# Patient Record
Sex: FEMALE | Race: WHITE | Employment: OTHER | URBAN - METROPOLITAN AREA
[De-identification: names, ages, dates, MRNs, and addresses within clinical notes are randomized per-mention and may not be internally consistent; named-entity substitution may affect disease eponyms.]

---

## 2020-01-22 ENCOUNTER — TRANSFERRED RECORDS (OUTPATIENT)
Dept: HEALTH INFORMATION MANAGEMENT | Facility: CLINIC | Age: 85
End: 2020-01-22

## 2020-11-09 ENCOUNTER — VIRTUAL VISIT (OUTPATIENT)
Dept: UROLOGY | Facility: CLINIC | Age: 85
End: 2020-11-09
Payer: COMMERCIAL

## 2020-11-09 DIAGNOSIS — R35.0 URINARY FREQUENCY: ICD-10-CM

## 2020-11-09 DIAGNOSIS — R39.15 URINARY URGENCY: ICD-10-CM

## 2020-11-09 PROCEDURE — 99204 OFFICE O/P NEW MOD 45 MIN: CPT | Mod: 95 | Performed by: UROLOGY

## 2020-11-09 RX ORDER — HYDROCHLOROTHIAZIDE 25 MG/1
25 TABLET ORAL DAILY
COMMUNITY

## 2020-11-09 NOTE — NURSING NOTE
Compounded estrogen cream ordered per Dr. Andre. Prescription sent to [facility] who will contact patient to discuss when ready.    Janet Garza RN, BSN

## 2020-11-09 NOTE — PROGRESS NOTES
CC:  Urinary urgency and frequency.    HPI:  Marnie Pittman is a 89 year old female new to our clinic. for the above.  This problem has been going on for a few months.  She was diagnosed with a uti and treated for 3 days and her symptoms improved slightly but did not resolve.  She then returned to her MD who gave her some additional antibiotics although the UA and UCx were negative, so she was asked to stop the antibiotics. It is associated with some urge incontinence.  It is minor and intermittent. The patient voids q2 hours, nocturia X q 2-3 hours.  She drinks 2 cups of coffee per day, a little juice and very little water, at the most 16 oz of water.  She denies any dysuria,  hematuria, hesitancy, intermittency, feeling of incomplete emptying, or any recent hx of UTI's or stones.  But she does have a slower stream    The patient has some constipation.  She is not sexually active and denies any dyspareunia or pelvic pain.   She denies any vaginal bulge.  She has no neurological or balance problems.     Obstetric Hx:  She is .    Menopause 52, HRT:  none    PMHx:  HTN    S/p partial hip replacement    Meds, Allergies, FHx and SHx reviewed per nurse's intake note.    ROS is negative on a 14 point scale except for except hard of hearing.  All other positive and pertinent information is mentioned in the HPI.    PEx:   There were no vitals taken for this visit.  Data Unavailable, There is no height or weight on file to calculate BMI., 0 lbs 0 oz  Gen appearance:  Well groomed  HEENT:  EOMI, AT NC, hearing aids  Psych:  Normal Affect  Neuro:  A/O X 3  Skin:  Well perfused  Resp:  No increased respiratory effort  Musk:  Full ROM in extremities  :  Deferred    ASSESSMENT and PLAN:  This is a 89 year old female with urinary urgency and frequency after in infection.  Different management options were discussed with the patient including observation, increasing fluid intake and estrogen cream.  The patient will  try.  -increase fluid intake to 40-60 oz per day  -Rx for Estrogen cream  -f/u in 3 months for cystoscopy     Thank you for allowing me to participate in Ms. Pittman's care.  I will keep you updated on her progress.    Cadence Andre MD

## 2020-11-09 NOTE — PROGRESS NOTES
"Marnie Pittman is a 89 year old female who is being evaluated via a billable video visit.      The patient has been notified of following:     \"This video visit will be conducted via a call between you and your physician/provider. We have found that certain health care needs can be provided without the need for an in-person physical exam.  This service lets us provide the care you need with a video conversation.  If a prescription is necessary we can send it directly to your pharmacy.  If lab work is needed we can place an order for that and you can then stop by our lab to have the test done at a later time.    Video visits are billed at different rates depending on your insurance coverage.  Please reach out to your insurance provider with any questions.    If during the course of the call the physician/provider feels a video visit is not appropriate, you will not be charged for this service.\"    Patient has given verbal consent for Video visit? Yes  How would you like to obtain your AVS? Mail a copy  If you are dropped from the video visit, the video invite should be resent to: [phone]  Will anyone else be joining your video visit? No        Video-Visit Details    Type of service:  Video Visit    Video Start Time: 11:15 AM  Video End Time: 11:45 AM    Originating Location (pt. Location): Home    Distant Location (provider location):  [facility]     Platform used for Video Visit: Yvette Andre MD      "

## 2020-11-09 NOTE — PATIENT INSTRUCTIONS
-increase fluid intake to 40-60 oz per day  -Rx for Estrogen cream  -f/u in 3 months for cystoscopy

## 2020-12-22 ENCOUNTER — TRANSFERRED RECORDS (OUTPATIENT)
Dept: HEALTH INFORMATION MANAGEMENT | Facility: CLINIC | Age: 85
End: 2020-12-22

## 2020-12-29 ENCOUNTER — TRANSFERRED RECORDS (OUTPATIENT)
Dept: HEALTH INFORMATION MANAGEMENT | Facility: CLINIC | Age: 85
End: 2020-12-29

## 2021-01-01 ENCOUNTER — OFFICE VISIT (OUTPATIENT)
Dept: TRANSPLANT | Facility: CLINIC | Age: 86
End: 2021-01-01
Payer: COMMERCIAL

## 2021-01-01 ENCOUNTER — TELEPHONE (OUTPATIENT)
Dept: SURGERY | Facility: CLINIC | Age: 86
End: 2021-01-01

## 2021-01-01 ENCOUNTER — OFFICE VISIT (OUTPATIENT)
Dept: RADIATION ONCOLOGY | Facility: CLINIC | Age: 86
End: 2021-01-01
Payer: COMMERCIAL

## 2021-01-01 ENCOUNTER — TRANSFERRED RECORDS (OUTPATIENT)
Dept: HEALTH INFORMATION MANAGEMENT | Facility: CLINIC | Age: 86
End: 2021-01-01

## 2021-01-01 ENCOUNTER — ANCILLARY PROCEDURE (OUTPATIENT)
Dept: CT IMAGING | Facility: CLINIC | Age: 86
End: 2021-01-01
Payer: COMMERCIAL

## 2021-01-01 ENCOUNTER — HEALTH MAINTENANCE LETTER (OUTPATIENT)
Age: 86
End: 2021-01-01

## 2021-01-01 ENCOUNTER — HOSPITAL ENCOUNTER (EMERGENCY)
Facility: CLINIC | Age: 86
End: 2021-01-01
Payer: COMMERCIAL

## 2021-01-01 ENCOUNTER — LAB (OUTPATIENT)
Dept: LAB | Facility: CLINIC | Age: 86
End: 2021-01-01
Payer: COMMERCIAL

## 2021-01-01 ENCOUNTER — ONCOLOGY VISIT (OUTPATIENT)
Dept: ONCOLOGY | Facility: CLINIC | Age: 86
End: 2021-01-01
Payer: COMMERCIAL

## 2021-01-01 ENCOUNTER — PATIENT OUTREACH (OUTPATIENT)
Dept: ONCOLOGY | Facility: CLINIC | Age: 86
End: 2021-01-01

## 2021-01-01 ENCOUNTER — NURSE TRIAGE (OUTPATIENT)
Dept: ONCOLOGY | Facility: CLINIC | Age: 86
End: 2021-01-01
Payer: COMMERCIAL

## 2021-01-01 ENCOUNTER — OFFICE VISIT (OUTPATIENT)
Dept: OTOLARYNGOLOGY | Facility: CLINIC | Age: 86
End: 2021-01-01
Payer: COMMERCIAL

## 2021-01-01 ENCOUNTER — PRE VISIT (OUTPATIENT)
Dept: TRANSPLANT | Facility: CLINIC | Age: 86
End: 2021-01-01

## 2021-01-01 ENCOUNTER — MYC MEDICAL ADVICE (OUTPATIENT)
Dept: ONCOLOGY | Facility: CLINIC | Age: 86
End: 2021-01-01
Payer: COMMERCIAL

## 2021-01-01 ENCOUNTER — PATIENT OUTREACH (OUTPATIENT)
Dept: ONCOLOGY | Facility: CLINIC | Age: 86
End: 2021-01-01
Payer: COMMERCIAL

## 2021-01-01 ENCOUNTER — PRE VISIT (OUTPATIENT)
Dept: OTOLARYNGOLOGY | Facility: CLINIC | Age: 86
End: 2021-01-01

## 2021-01-01 ENCOUNTER — TELEPHONE (OUTPATIENT)
Dept: ONCOLOGY | Facility: CLINIC | Age: 86
End: 2021-01-01
Payer: COMMERCIAL

## 2021-01-01 ENCOUNTER — APPOINTMENT (OUTPATIENT)
Dept: RADIATION ONCOLOGY | Facility: CLINIC | Age: 86
End: 2021-01-01
Attending: RADIOLOGY
Payer: COMMERCIAL

## 2021-01-01 ENCOUNTER — APPOINTMENT (OUTPATIENT)
Dept: LAB | Facility: CLINIC | Age: 86
End: 2021-01-01
Payer: COMMERCIAL

## 2021-01-01 VITALS
DIASTOLIC BLOOD PRESSURE: 69 MMHG | RESPIRATION RATE: 16 BRPM | HEART RATE: 78 BPM | SYSTOLIC BLOOD PRESSURE: 182 MMHG | WEIGHT: 160.2 LBS | OXYGEN SATURATION: 96 % | TEMPERATURE: 97.3 F

## 2021-01-01 VITALS
HEART RATE: 56 BPM | DIASTOLIC BLOOD PRESSURE: 65 MMHG | RESPIRATION RATE: 16 BRPM | WEIGHT: 147.7 LBS | SYSTOLIC BLOOD PRESSURE: 158 MMHG | TEMPERATURE: 98 F | OXYGEN SATURATION: 98 %

## 2021-01-01 VITALS
DIASTOLIC BLOOD PRESSURE: 67 MMHG | WEIGHT: 140.5 LBS | TEMPERATURE: 97.9 F | SYSTOLIC BLOOD PRESSURE: 146 MMHG | HEART RATE: 69 BPM | OXYGEN SATURATION: 98 %

## 2021-01-01 VITALS
TEMPERATURE: 97.2 F | DIASTOLIC BLOOD PRESSURE: 62 MMHG | WEIGHT: 147.9 LBS | RESPIRATION RATE: 20 BRPM | OXYGEN SATURATION: 96 % | HEART RATE: 64 BPM | SYSTOLIC BLOOD PRESSURE: 132 MMHG

## 2021-01-01 VITALS
HEART RATE: 67 BPM | WEIGHT: 146 LBS | OXYGEN SATURATION: 97 % | SYSTOLIC BLOOD PRESSURE: 118 MMHG | RESPIRATION RATE: 16 BRPM | TEMPERATURE: 97.1 F | DIASTOLIC BLOOD PRESSURE: 68 MMHG

## 2021-01-01 VITALS
WEIGHT: 140.3 LBS | HEART RATE: 80 BPM | DIASTOLIC BLOOD PRESSURE: 62 MMHG | SYSTOLIC BLOOD PRESSURE: 112 MMHG | OXYGEN SATURATION: 96 % | RESPIRATION RATE: 18 BRPM

## 2021-01-01 VITALS — OXYGEN SATURATION: 95 % | TEMPERATURE: 98.6 F | HEART RATE: 69 BPM

## 2021-01-01 DIAGNOSIS — C85.12 B-CELL LYMPHOMA OF INTRATHORACIC LYMPH NODES, UNSPECIFIED B-CELL LYMPHOMA TYPE (H): Primary | ICD-10-CM

## 2021-01-01 DIAGNOSIS — C85.12 B-CELL LYMPHOMA OF INTRATHORACIC LYMPH NODES, UNSPECIFIED B-CELL LYMPHOMA TYPE (H): ICD-10-CM

## 2021-01-01 DIAGNOSIS — Z23 ENCOUNTER FOR IMMUNIZATION: ICD-10-CM

## 2021-01-01 DIAGNOSIS — C83.32 DIFFUSE LARGE B-CELL LYMPHOMA OF INTRATHORACIC LYMPH NODES (H): Primary | ICD-10-CM

## 2021-01-01 DIAGNOSIS — R76.8 ELEVATED ANTI-TISSUE TRANSGLUTAMINASE (TTG) IGA LEVEL: ICD-10-CM

## 2021-01-01 DIAGNOSIS — Z11.59 ENCOUNTER FOR SCREENING FOR OTHER VIRAL DISEASES: ICD-10-CM

## 2021-01-01 DIAGNOSIS — R59.1 LYMPHADENOPATHY: ICD-10-CM

## 2021-01-01 DIAGNOSIS — R59.0 CERVICAL LYMPHADENOPATHY: Primary | ICD-10-CM

## 2021-01-01 LAB
ALBUMIN SERPL ELPH-MCNC: 3.6 G/DL (ref 3.7–5.1)
ALBUMIN SERPL-MCNC: 2.7 G/DL (ref 3.4–5)
ALBUMIN SERPL-MCNC: 3.3 G/DL (ref 3.4–5)
ALBUMIN SERPL-MCNC: 3.3 G/DL (ref 3.4–5)
ALBUMIN SERPL-MCNC: 3.6 G/DL (ref 3.4–5)
ALP SERPL-CCNC: 109 U/L (ref 40–150)
ALP SERPL-CCNC: 114 U/L (ref 40–150)
ALP SERPL-CCNC: 126 U/L (ref 40–150)
ALP SERPL-CCNC: 126 U/L (ref 40–150)
ALPHA1 GLOB SERPL ELPH-MCNC: 0.4 G/DL (ref 0.2–0.4)
ALPHA2 GLOB SERPL ELPH-MCNC: 1 G/DL (ref 0.5–0.9)
ALT SERPL W P-5'-P-CCNC: 15 U/L (ref 0–50)
ALT SERPL W P-5'-P-CCNC: 17 U/L (ref 0–50)
ALT SERPL W P-5'-P-CCNC: 18 U/L (ref 0–50)
ALT SERPL W P-5'-P-CCNC: 20 U/L (ref 0–50)
ANION GAP SERPL CALCULATED.3IONS-SCNC: 12 MMOL/L (ref 3–14)
ANION GAP SERPL CALCULATED.3IONS-SCNC: 4 MMOL/L (ref 3–14)
ANION GAP SERPL CALCULATED.3IONS-SCNC: 5 MMOL/L (ref 3–14)
ANION GAP SERPL CALCULATED.3IONS-SCNC: 8 MMOL/L (ref 3–14)
AST SERPL W P-5'-P-CCNC: 21 U/L (ref 0–45)
AST SERPL W P-5'-P-CCNC: 21 U/L (ref 0–45)
AST SERPL W P-5'-P-CCNC: 22 U/L (ref 0–45)
AST SERPL W P-5'-P-CCNC: 24 U/L (ref 0–45)
B-GLOBULIN SERPL ELPH-MCNC: 1.1 G/DL (ref 0.6–1)
BASOPHILS # BLD AUTO: 0 10E3/UL (ref 0–0.2)
BASOPHILS NFR BLD AUTO: 0 %
BASOPHILS NFR BLD AUTO: 1 %
BILIRUB SERPL-MCNC: 0.5 MG/DL (ref 0.2–1.3)
BILIRUB SERPL-MCNC: 0.9 MG/DL (ref 0.2–1.3)
BUN SERPL-MCNC: 22 MG/DL (ref 7–30)
BUN SERPL-MCNC: 23 MG/DL (ref 7–30)
BUN SERPL-MCNC: 23 MG/DL (ref 7–30)
BUN SERPL-MCNC: 28 MG/DL (ref 7–30)
CALCIUM SERPL-MCNC: 9.2 MG/DL (ref 8.5–10.1)
CALCIUM SERPL-MCNC: 9.3 MG/DL (ref 8.5–10.1)
CALCIUM SERPL-MCNC: 9.4 MG/DL (ref 8.5–10.1)
CALCIUM SERPL-MCNC: 9.6 MG/DL (ref 8.5–10.1)
CHLORIDE BLD-SCNC: 100 MMOL/L (ref 94–109)
CHLORIDE BLD-SCNC: 104 MMOL/L (ref 94–109)
CHLORIDE BLD-SCNC: 104 MMOL/L (ref 94–109)
CHLORIDE BLD-SCNC: 107 MMOL/L (ref 94–109)
CO2 SERPL-SCNC: 19 MMOL/L (ref 20–32)
CO2 SERPL-SCNC: 29 MMOL/L (ref 20–32)
CO2 SERPL-SCNC: 30 MMOL/L (ref 20–32)
CO2 SERPL-SCNC: 31 MMOL/L (ref 20–32)
COPATH REPORT: NORMAL
CREAT BLD-MCNC: 0.9 MG/DL (ref 0.52–1.04)
CREAT BLD-MCNC: 1.1 MG/DL
CREAT SERPL-MCNC: 0.86 MG/DL (ref 0.52–1.04)
CREAT SERPL-MCNC: 0.88 MG/DL (ref 0.52–1.04)
CREAT SERPL-MCNC: 0.92 MG/DL (ref 0.52–1.04)
CREAT SERPL-MCNC: 1.01 MG/DL (ref 0.52–1.04)
EOSINOPHIL # BLD AUTO: 0.1 10E3/UL (ref 0–0.7)
EOSINOPHIL # BLD AUTO: 0.2 10E3/UL (ref 0–0.7)
EOSINOPHIL # BLD AUTO: 0.2 10E3/UL (ref 0–0.7)
EOSINOPHIL # BLD AUTO: 0.4 10E3/UL (ref 0–0.7)
EOSINOPHIL NFR BLD AUTO: 2 %
EOSINOPHIL NFR BLD AUTO: 3 %
EOSINOPHIL NFR BLD AUTO: 4 %
EOSINOPHIL NFR BLD AUTO: 6 %
ERYTHROCYTE [DISTWIDTH] IN BLOOD BY AUTOMATED COUNT: 14.7 % (ref 10–15)
ERYTHROCYTE [DISTWIDTH] IN BLOOD BY AUTOMATED COUNT: 15.1 % (ref 10–15)
ERYTHROCYTE [DISTWIDTH] IN BLOOD BY AUTOMATED COUNT: 15.9 % (ref 10–15)
ERYTHROCYTE [DISTWIDTH] IN BLOOD BY AUTOMATED COUNT: 15.9 % (ref 10–15)
GAMMA GLOB SERPL ELPH-MCNC: 1.4 G/DL (ref 0.7–1.6)
GFR SERPL CREATININE-BSD FRML MDRD: 44 ML/MIN/1.73M2
GFR SERPL CREATININE-BSD FRML MDRD: 49 ML/MIN/1.73M2
GFR SERPL CREATININE-BSD FRML MDRD: 54 ML/MIN/1.73M2
GFR SERPL CREATININE-BSD FRML MDRD: 57 ML/MIN/1.73M2
GFR SERPL CREATININE-BSD FRML MDRD: 59 ML/MIN/1.73M2
GFR SERPL CREATININE-BSD FRML MDRD: 59 ML/MIN/{1.73_M2}
GLUCOSE BLD-MCNC: 88 MG/DL (ref 70–99)
GLUCOSE BLD-MCNC: 93 MG/DL (ref 70–99)
GLUCOSE BLD-MCNC: 96 MG/DL (ref 70–99)
GLUCOSE BLD-MCNC: 99 MG/DL (ref 70–99)
HCT VFR BLD AUTO: 35.5 % (ref 35–47)
HCT VFR BLD AUTO: 37.6 % (ref 35–47)
HCT VFR BLD AUTO: 39.7 % (ref 35–47)
HCT VFR BLD AUTO: 40.9 % (ref 35–47)
HGB BLD-MCNC: 11.6 G/DL (ref 11.7–15.7)
HGB BLD-MCNC: 12.3 G/DL (ref 11.7–15.7)
HGB BLD-MCNC: 12.9 G/DL (ref 11.7–15.7)
HGB BLD-MCNC: 13.3 G/DL (ref 11.7–15.7)
IGA SERPL-MCNC: 790 MG/DL (ref 84–499)
IGA SERPL-MCNC: 796 MG/DL (ref 84–499)
IGA SERPL-MCNC: 830 MG/DL (ref 84–499)
IGG SERPL-MCNC: 1249 MG/DL (ref 610–1616)
IGG SERPL-MCNC: 1276 MG/DL (ref 610–1616)
IGG SERPL-MCNC: 1290 MG/DL (ref 610–1616)
IGM SERPL-MCNC: 50 MG/DL (ref 35–242)
IGM SERPL-MCNC: 50 MG/DL (ref 35–242)
IGM SERPL-MCNC: 51 MG/DL (ref 35–242)
IMM GRANULOCYTES # BLD: 0 10E3/UL
IMM GRANULOCYTES NFR BLD: 0 %
KAPPA LC FREE SER-MCNC: 5.15 MG/DL (ref 0.33–1.94)
KAPPA LC FREE/LAMBDA FREE SER NEPH: 1.47 {RATIO} (ref 0.26–1.65)
LAMBDA LC FREE SERPL-MCNC: 3.5 MG/DL (ref 0.57–2.63)
LDH SERPL L TO P-CCNC: 181 U/L (ref 81–234)
LDH SERPL L TO P-CCNC: 187 U/L (ref 81–234)
LDH SERPL L TO P-CCNC: 200 U/L (ref 81–234)
LDH SERPL L TO P-CCNC: 323 U/L (ref 81–234)
LYMPHOCYTES # BLD AUTO: 1.4 10E3/UL (ref 0.8–5.3)
LYMPHOCYTES # BLD AUTO: 1.6 10E3/UL (ref 0.8–5.3)
LYMPHOCYTES # BLD AUTO: 1.8 10E3/UL (ref 0.8–5.3)
LYMPHOCYTES # BLD AUTO: 2.4 10E3/UL (ref 0.8–5.3)
LYMPHOCYTES NFR BLD AUTO: 21 %
LYMPHOCYTES NFR BLD AUTO: 27 %
LYMPHOCYTES NFR BLD AUTO: 30 %
LYMPHOCYTES NFR BLD AUTO: 35 %
M PROTEIN SERPL ELPH-MCNC: 0 G/DL
MCH RBC QN AUTO: 27.3 PG (ref 26.5–33)
MCH RBC QN AUTO: 27.3 PG (ref 26.5–33)
MCH RBC QN AUTO: 27.6 PG (ref 26.5–33)
MCH RBC QN AUTO: 27.6 PG (ref 26.5–33)
MCHC RBC AUTO-ENTMCNC: 32.5 G/DL (ref 31.5–36.5)
MCHC RBC AUTO-ENTMCNC: 32.5 G/DL (ref 31.5–36.5)
MCHC RBC AUTO-ENTMCNC: 32.7 G/DL (ref 31.5–36.5)
MCHC RBC AUTO-ENTMCNC: 32.7 G/DL (ref 31.5–36.5)
MCV RBC AUTO: 84 FL (ref 78–100)
MCV RBC AUTO: 84 FL (ref 78–100)
MCV RBC AUTO: 85 FL (ref 78–100)
MCV RBC AUTO: 85 FL (ref 78–100)
MONOCYTES # BLD AUTO: 0.6 10E3/UL (ref 0–1.3)
MONOCYTES # BLD AUTO: 0.6 10E3/UL (ref 0–1.3)
MONOCYTES # BLD AUTO: 0.8 10E3/UL (ref 0–1.3)
MONOCYTES # BLD AUTO: 0.8 10E3/UL (ref 0–1.3)
MONOCYTES NFR BLD AUTO: 10 %
MONOCYTES NFR BLD AUTO: 12 %
MONOCYTES NFR BLD AUTO: 14 %
MONOCYTES NFR BLD AUTO: 9 %
NEUTROPHILS # BLD AUTO: 3.1 10E3/UL (ref 1.6–8.3)
NEUTROPHILS # BLD AUTO: 3.3 10E3/UL (ref 1.6–8.3)
NEUTROPHILS # BLD AUTO: 3.4 10E3/UL (ref 1.6–8.3)
NEUTROPHILS # BLD AUTO: 4.4 10E3/UL (ref 1.6–8.3)
NEUTROPHILS NFR BLD AUTO: 49 %
NEUTROPHILS NFR BLD AUTO: 52 %
NEUTROPHILS NFR BLD AUTO: 57 %
NEUTROPHILS NFR BLD AUTO: 68 %
NRBC # BLD AUTO: 0 10E3/UL
NRBC BLD AUTO-RTO: 0 /100
PLATELET # BLD AUTO: 237 10E3/UL (ref 150–450)
PLATELET # BLD AUTO: 249 10E3/UL (ref 150–450)
PLATELET # BLD AUTO: 253 10E3/UL (ref 150–450)
PLATELET # BLD AUTO: 332 10E3/UL (ref 150–450)
POTASSIUM BLD-SCNC: 3.3 MMOL/L (ref 3.4–5.3)
POTASSIUM BLD-SCNC: 3.4 MMOL/L (ref 3.4–5.3)
POTASSIUM BLD-SCNC: 3.7 MMOL/L (ref 3.4–5.3)
POTASSIUM BLD-SCNC: 4 MMOL/L (ref 3.4–5.3)
PROT PATTERN SERPL ELPH-IMP: ABNORMAL
PROT PATTERN SERPL IFE-IMP: NORMAL
PROT SERPL-MCNC: 7.4 G/DL (ref 6.8–8.8)
PROT SERPL-MCNC: 8 G/DL (ref 6.8–8.8)
PROT SERPL-MCNC: 8.1 G/DL (ref 6.8–8.8)
PROT SERPL-MCNC: 8.2 G/DL (ref 6.8–8.8)
RBC # BLD AUTO: 4.25 10E6/UL (ref 3.8–5.2)
RBC # BLD AUTO: 4.45 10E6/UL (ref 3.8–5.2)
RBC # BLD AUTO: 4.67 10E6/UL (ref 3.8–5.2)
RBC # BLD AUTO: 4.88 10E6/UL (ref 3.8–5.2)
SODIUM SERPL-SCNC: 138 MMOL/L (ref 133–144)
SODIUM SERPL-SCNC: 139 MMOL/L (ref 133–144)
TOTAL PROTEIN SERUM FOR ELP: 7.6 G/DL (ref 6.8–8.8)
URATE SERPL-MCNC: 5.3 MG/DL (ref 2.6–6)
URATE SERPL-MCNC: 5.9 MG/DL (ref 2.6–6)
URATE SERPL-MCNC: 6.8 MG/DL (ref 2.6–6)
WBC # BLD AUTO: 5.9 10E3/UL (ref 4–11)
WBC # BLD AUTO: 6 10E3/UL (ref 4–11)
WBC # BLD AUTO: 6.6 10E3/UL (ref 4–11)
WBC # BLD AUTO: 6.7 10E3/UL (ref 4–11)

## 2021-01-01 PROCEDURE — 99205 OFFICE O/P NEW HI 60 MIN: CPT

## 2021-01-01 PROCEDURE — G0463 HOSPITAL OUTPT CLINIC VISIT: HCPCS

## 2021-01-01 PROCEDURE — 85025 COMPLETE CBC W/AUTO DIFF WBC: CPT

## 2021-01-01 PROCEDURE — 90471 IMMUNIZATION ADMIN: CPT

## 2021-01-01 PROCEDURE — 82040 ASSAY OF SERUM ALBUMIN: CPT

## 2021-01-01 PROCEDURE — 85004 AUTOMATED DIFF WBC COUNT: CPT

## 2021-01-01 PROCEDURE — 36415 COLL VENOUS BLD VENIPUNCTURE: CPT

## 2021-01-01 PROCEDURE — 83615 LACTATE (LD) (LDH) ENZYME: CPT

## 2021-01-01 PROCEDURE — G0463 HOSPITAL OUTPT CLINIC VISIT: HCPCS | Mod: 25

## 2021-01-01 PROCEDURE — G0463 HOSPITAL OUTPT CLINIC VISIT: HCPCS | Performed by: RADIOLOGY

## 2021-01-01 PROCEDURE — 90471 IMMUNIZATION ADMIN: CPT | Performed by: PHYSICIAN ASSISTANT

## 2021-01-01 PROCEDURE — 90750 HZV VACC RECOMBINANT IM: CPT

## 2021-01-01 PROCEDURE — 250N000021 HC RX MED A9270 GY (STAT IND- M) 250: Performed by: PHYSICIAN ASSISTANT

## 2021-01-01 PROCEDURE — 90750 HZV VACC RECOMBINANT IM: CPT | Performed by: PHYSICIAN ASSISTANT

## 2021-01-01 PROCEDURE — 999N001032 HC STATISTIC REVIEW OUTSIDE SLIDES TC 88321

## 2021-01-01 PROCEDURE — 83883 ASSAY NEPHELOMETRY NOT SPEC: CPT

## 2021-01-01 PROCEDURE — 84155 ASSAY OF PROTEIN SERUM: CPT | Mod: 59

## 2021-01-01 PROCEDURE — 99213 OFFICE O/P EST LOW 20 MIN: CPT

## 2021-01-01 PROCEDURE — 86334 IMMUNOFIX E-PHORESIS SERUM: CPT

## 2021-01-01 PROCEDURE — 84165 PROTEIN E-PHORESIS SERUM: CPT | Mod: 26 | Performed by: PATHOLOGY

## 2021-01-01 PROCEDURE — 82784 ASSAY IGA/IGD/IGG/IGM EACH: CPT

## 2021-01-01 PROCEDURE — 84550 ASSAY OF BLOOD/URIC ACID: CPT

## 2021-01-01 PROCEDURE — 99214 OFFICE O/P EST MOD 30 MIN: CPT

## 2021-01-01 PROCEDURE — 82565 ASSAY OF CREATININE: CPT | Performed by: RADIOLOGY

## 2021-01-01 PROCEDURE — 99215 OFFICE O/P EST HI 40 MIN: CPT | Mod: GC

## 2021-01-01 PROCEDURE — 99215 OFFICE O/P EST HI 40 MIN: CPT | Performed by: PHYSICIAN ASSISTANT

## 2021-01-01 PROCEDURE — 71260 CT THORAX DX C+: CPT | Performed by: RADIOLOGY

## 2021-01-01 PROCEDURE — 74177 CT ABD & PELVIS W/CONTRAST: CPT | Performed by: RADIOLOGY

## 2021-01-01 PROCEDURE — 80053 COMPREHEN METABOLIC PANEL: CPT

## 2021-01-01 PROCEDURE — 250N000021 HC RX MED A9270 GY (STAT IND- M) 250

## 2021-01-01 PROCEDURE — 99202 OFFICE O/P NEW SF 15 MIN: CPT | Performed by: SURGERY

## 2021-01-01 PROCEDURE — 88321 CONSLTJ&REPRT SLD PREP ELSWR: CPT | Performed by: PATHOLOGY

## 2021-01-01 RX ORDER — IOPAMIDOL 755 MG/ML
99 INJECTION, SOLUTION INTRAVASCULAR ONCE
Status: COMPLETED | OUTPATIENT
Start: 2021-01-01 | End: 2021-01-01

## 2021-01-01 RX ORDER — METHYLPREDNISOLONE SODIUM SUCCINATE 125 MG/2ML
125 INJECTION, POWDER, LYOPHILIZED, FOR SOLUTION INTRAMUSCULAR; INTRAVENOUS
Status: CANCELLED
Start: 2021-01-01

## 2021-01-01 RX ORDER — HEPARIN SODIUM (PORCINE) LOCK FLUSH IV SOLN 100 UNIT/ML 100 UNIT/ML
5 SOLUTION INTRAVENOUS
Status: CANCELLED | OUTPATIENT
Start: 2021-01-01

## 2021-01-01 RX ORDER — ONDANSETRON 2 MG/ML
8 INJECTION INTRAMUSCULAR; INTRAVENOUS ONCE
Status: CANCELLED | OUTPATIENT
Start: 2021-01-01

## 2021-01-01 RX ORDER — PREDNISONE 20 MG/1
60 TABLET ORAL DAILY
Qty: 60 TABLET | Refills: 1 | Status: SHIPPED | OUTPATIENT
Start: 2021-01-01

## 2021-01-01 RX ORDER — LORAZEPAM 2 MG/ML
0.5 INJECTION INTRAMUSCULAR EVERY 4 HOURS PRN
Status: CANCELLED | OUTPATIENT
Start: 2021-01-01

## 2021-01-01 RX ORDER — ALBUTEROL SULFATE 0.83 MG/ML
2.5 SOLUTION RESPIRATORY (INHALATION)
Status: CANCELLED | OUTPATIENT
Start: 2021-01-01

## 2021-01-01 RX ORDER — PREDNISONE 20 MG/1
40 TABLET ORAL DAILY
Qty: 14 TABLET | Refills: 0 | Status: SHIPPED | OUTPATIENT
Start: 2021-01-01 | End: 2021-01-01

## 2021-01-01 RX ORDER — TRAMADOL HYDROCHLORIDE 50 MG/1
50 TABLET ORAL EVERY 6 HOURS PRN
Qty: 60 TABLET | Refills: 1 | Status: SHIPPED | OUTPATIENT
Start: 2021-01-01

## 2021-01-01 RX ORDER — POTASSIUM CHLORIDE 750 MG/1
TABLET, EXTENDED RELEASE ORAL
COMMUNITY
Start: 2021-01-01 | End: 2021-01-01

## 2021-01-01 RX ORDER — ALBUTEROL SULFATE 90 UG/1
1-2 AEROSOL, METERED RESPIRATORY (INHALATION)
Status: CANCELLED
Start: 2021-01-01

## 2021-01-01 RX ORDER — EPINEPHRINE 1 MG/ML
0.3 INJECTION, SOLUTION INTRAMUSCULAR; SUBCUTANEOUS EVERY 5 MIN PRN
Status: CANCELLED | OUTPATIENT
Start: 2021-01-01

## 2021-01-01 RX ORDER — ACETAMINOPHEN 500 MG
500 TABLET ORAL
COMMUNITY
Start: 2021-01-01

## 2021-01-01 RX ORDER — PREDNISONE 20 MG/1
60 TABLET ORAL DAILY
Qty: 15 TABLET | Refills: 0 | Status: SHIPPED | OUTPATIENT
Start: 2021-01-01 | End: 2021-01-01

## 2021-01-01 RX ORDER — ALLOPURINOL 300 MG/1
300 TABLET ORAL DAILY
Qty: 30 TABLET | Refills: 1 | Status: SHIPPED | OUTPATIENT
Start: 2021-01-01

## 2021-01-01 RX ORDER — NALOXONE HYDROCHLORIDE 0.4 MG/ML
0.2 INJECTION, SOLUTION INTRAMUSCULAR; INTRAVENOUS; SUBCUTANEOUS
Status: CANCELLED | OUTPATIENT
Start: 2021-01-01

## 2021-01-01 RX ORDER — DIPHENHYDRAMINE HYDROCHLORIDE 50 MG/ML
50 INJECTION INTRAMUSCULAR; INTRAVENOUS
Status: CANCELLED
Start: 2021-01-01

## 2021-01-01 RX ORDER — IOPAMIDOL 755 MG/ML
89 INJECTION, SOLUTION INTRAVASCULAR ONCE
Status: COMPLETED | OUTPATIENT
Start: 2021-01-01 | End: 2021-01-01

## 2021-01-01 RX ORDER — MEPERIDINE HYDROCHLORIDE 25 MG/ML
25 INJECTION INTRAMUSCULAR; INTRAVENOUS; SUBCUTANEOUS EVERY 30 MIN PRN
Status: CANCELLED | OUTPATIENT
Start: 2021-01-01

## 2021-01-01 RX ORDER — HEPARIN SODIUM,PORCINE 10 UNIT/ML
5 VIAL (ML) INTRAVENOUS
Status: CANCELLED | OUTPATIENT
Start: 2021-01-01

## 2021-01-01 RX ADMIN — IOPAMIDOL 89 ML: 755 INJECTION, SOLUTION INTRAVASCULAR at 12:38

## 2021-01-01 RX ADMIN — IOPAMIDOL 99 ML: 755 INJECTION, SOLUTION INTRAVASCULAR at 12:30

## 2021-01-01 RX ADMIN — ZOSTER VACCINE RECOMBINANT, ADJUVANTED 0.5 ML: KIT at 14:27

## 2021-01-01 RX ADMIN — ZOSTER VACCINE RECOMBINANT, ADJUVANTED 0.5 ML: KIT at 13:31

## 2021-01-01 SDOH — HEALTH STABILITY: MENTAL HEALTH: HOW OFTEN DO YOU HAVE A DRINK CONTAINING ALCOHOL?: NOT ASKED

## 2021-01-01 SDOH — HEALTH STABILITY: MENTAL HEALTH: HOW MANY STANDARD DRINKS CONTAINING ALCOHOL DO YOU HAVE ON A TYPICAL DAY?: NOT ASKED

## 2021-01-01 SDOH — HEALTH STABILITY: MENTAL HEALTH: HOW OFTEN DO YOU HAVE 6 OR MORE DRINKS ON ONE OCCASION?: NOT ASKED

## 2021-01-01 ASSESSMENT — PAIN SCALES - GENERAL
PAINLEVEL: NO PAIN (0)
PAINLEVEL: NO PAIN (0)
PAINLEVEL: SEVERE PAIN (7)
PAINLEVEL: MODERATE PAIN (5)
PAINLEVEL: MODERATE PAIN (5)
PAINLEVEL: NO PAIN (0)
PAINLEVEL: NO PAIN (0)

## 2021-01-01 ASSESSMENT — ENCOUNTER SYMPTOMS
BACK PAIN: 1
SHORTNESS OF BREATH: 1
WHEEZING: 1

## 2021-01-06 ENCOUNTER — TRANSFERRED RECORDS (OUTPATIENT)
Dept: HEALTH INFORMATION MANAGEMENT | Facility: CLINIC | Age: 86
End: 2021-01-06

## 2021-01-12 ENCOUNTER — TRANSFERRED RECORDS (OUTPATIENT)
Dept: HEALTH INFORMATION MANAGEMENT | Facility: CLINIC | Age: 86
End: 2021-01-12

## 2021-02-22 ENCOUNTER — TRANSCRIBE ORDERS (OUTPATIENT)
Dept: OTHER | Age: 86
End: 2021-02-22

## 2021-02-22 DIAGNOSIS — R59.1 LYMPHADENOPATHY: Primary | ICD-10-CM

## 2021-02-26 ENCOUNTER — PATIENT OUTREACH (OUTPATIENT)
Dept: ONCOLOGY | Facility: CLINIC | Age: 86
End: 2021-02-26

## 2021-02-26 ENCOUNTER — DOCUMENTATION ONLY (OUTPATIENT)
Dept: ONCOLOGY | Facility: CLINIC | Age: 86
End: 2021-02-26

## 2021-02-26 NOTE — PROGRESS NOTES
Action Vivian 2/26/2021 2:10PM   Action Taken CSS called and spoke with pt, all recs are with [facility]  CSS faxed to [facility] to obtain recs and IMGS / BELLE may be needed from pt CSS emailed BELLE to matias Napier at [email or web address])      Action Vivian 3/1/2021 6:50AM   Action Taken CSS received BELLE from pt, faxed to [facility] to obtain recs and IMGS     Action Vivian 3/4/2021 1:02PM   Action Taken CSS called [facility] to check on status on recs, CSS spoke with Elizabeth to check on status. Zeb Johnson was asked to re fax req to [phone]       Action Vivian 3/8/2021 2:10PM   Action Taken CSS faxed to [facility] to obtain recs      Action Vivian 3/11/2021 9:05AM   Action Taken CSS re-faxed to [facility] to obtain recs, CSS also called [facility] and spoke with Danyell. Zeb Child req to re-fax recs req     Update: 11:07AM - CSS called and spoke with [facility] BELLE Dept / Per Staff she will send  Reanna a message to send recs ASAP   CSS informed Pati Pitt RN recs status   CSS called and LM for PT's Son Shailesh that recs are still pending                H Plasty Text: Given the location of the defect, shape of the defect and the proximity to free margins a H-plasty was deemed most appropriate for repair.  Using a sterile surgical marker, the appropriate advancement arms of the H-plasty were drawn incorporating the defect and placing the expected incisions within the relaxed skin tension lines where possible. The area thus outlined was incised deep to adipose tissue with a #15 scalpel blade. The skin margins were undermined to an appropriate distance in all directions utilizing iris scissors.  The opposing advancement arms were then advanced into place in opposite direction and anchored with interrupted buried subcutaneous sutures.

## 2021-02-26 NOTE — PROGRESS NOTES
New Patient Oncology Nurse Navigator Note     Referring Clinic/Organization: Self Referred (son)     Referral Placed: February 22, 2021     Evaluation for : lymphadenopathy 2nd opinion     Referral updates and Plan:   February 26, 2021 Introduced my role as nurse navigator with [facility] and that we have recd the referral for dx of lymphadenopathy, 2nd opinion , but no records yet.  Recent medical hx per pt's son: Malaise and urinary issues back to Oct 2020. Txed for UTI. Losing weight. Continues to lose weight, in November txed again with abx for presumptive UTI again. Had Covid infection in December, very ill and recovered. FP ordered CT n/c/a/p ([facility]) in late Dec/early Jan. CT showed large LAD in chest, mostly in hilum. With partial collapse of lung. TB ruled out. Serum lab testing did not reveal any notable abnormalities per his recall  Presentation most consistent with lymphoma. Most accessible LN under Right clavicle and needle bx was done -- nondiagnostic: necrotizing granulomatous tissue.  Pt's son wants further diagnostic work up recs, saw thoracic surgeon as well and bronch would be next step bx-wise and general anesthesia is causing him to explore 2nd opinion re: other possible options as his mother does not want to pursure bx with general anesthesia  Son retired from [employer] after working there straight out of medical school  Explained to pt's son that he will receive a call from our scheduling intake team once consult options are determined based on NN and MD review, and the records team will continue to work to obtain the records today, will contact him if signature for BELLE needed (requested on 2/22 per notes).   Pt's son verbalized understanding and denied further questions, has my call-back number if needed.    March 11, 2021 Copy of outside records recd for NN review:    12/29/20 PET     pulm consult    1/6/21 US neck - Right supraclav LN BX  OUTGOING call to  pt's son to offer consult , records recd / reviewed today. Pt's sons declines, stating pt is being seen elsewhere sooner. Thanked me for the follow up call. Referral cancelled.  Pati Pitt, RN, BSN, OCN  Hematology/Oncology Nurse Navigator   [facility]  [phone]

## 2021-04-09 NOTE — TELEPHONE ENCOUNTER
Action    Action Taken 4/9/21:    -Traci [facility] - Path: [id number]    4/14/21:    Slides received  3:00 PM       RECORDS STATUS - ALL OTHER DIAGNOSIS      RECORDS RECEIVED FROM: [facility]   DATE RECEIVED:    NOTES STATUS DETAILS   OFFICE NOTE from referring provider     OFFICE NOTE from medical oncologist     DISCHARGE SUMMARY from hospital     DISCHARGE REPORT from the ER     OPERATIVE REPORT     MEDICATION LIST     CLINICAL TRIAL TREATMENTS TO DATE     LABS     PATHOLOGY REPORTS [facility], Report in Epic, slides requested 1/6/21: [id number]    ANYTHING RELATED TO DIAGNOSIS     GENONOMIC TESTING     TYPE:     IMAGING (NEED IMAGES & REPORT)     CT SCANS     MRI     MAMMO     ULTRASOUND PACS 1/6/21: [facility]   PET PACS 12/29/20: [facility]

## 2021-04-28 NOTE — LETTER
4/28/2021         RE: Marnie Pittman  [address]  [place] [address]        Dear Colleague,    Thank you for referring your patient, Marnie Pittman, to the [facility] [place]. Please see a copy of my visit note below.    [facility] VISIT NOTE.  4/28/2021      I had the pleasure to see Mrs. Marnie Pittman at [facility] today.  She was accompanied by her son Shailesh and they are seeking 2nd opinion.    Patient is a delightful 89 female in a remarkable health who presented to PCP in December with frequent night time urination. She also had a COVID infection in November but stayed at her nursing home appartment and recovered well. As the etiology of urinary frequency could not be found, she ultimately underwent CT chest which incidentally detected supraclavicular and mediastina lymphadenopathy.  She denies significant changes to her health in past few months, and had no cough, SOB or chest pain.   For work -up underwent supraclavicular biopsy on January 6 with findings of focally necrotizing granulomatous inflammation with a multicentric giant cells the AFB and GMC stains were negative.  Subsequently she had another EBUS with a biopsy of mediastinal nodes.  These again showed necrotizing granulomatous inflammation but some atypia in the B cells and suspicious from the B-cell lymphoma.  Her PET on 12/29/20 had following conclusion:  IMPRESSION:   1.  Mediastinal, right hilar, and right cervical lymphadenopathy with increased metabolic activity.  There is also increased metabolic activity in the area of the proximal right upper lobe consolidation/atelectasis.  Differential considerations would include lymphoma or potentially a right hilar mass with metastatic lymphadenopathy.  Recommend bronchoscopy for core biopsy of the right supraclavicular lymph node.  2.  Probable infectious/inflammatory process in the right lower lobe.  3.  Large hiatal  hernia, unchanged.  4.  Diverticulosis.    Subsequent CT scan in March 2021:    There is a masslike consolidation involving the inferior aspect of the right upper lobe extending to the right helium is has progressed 4.4 cm compares to 3.4 cm previously there is also postobstructive nodular infiltrate the adenopathy involves the right hilar region and the right supraclavicular region this progressed up to 2.5 cm compared to 2.1 cm previously.    Labs: CBC is normal. Chemistry also normal with mild elevation of Alk phos and albumin 3.3.    PMHX;  Pt is fairly healthy given her age,  Mild hypertension,     SHX: lives alone in [address] apartment [place], fairly independnet, still drives and does her shopping. Go cognitive function.    BP (!) 182/69 (BP Location: Right arm, Patient Position: Sitting, Cuff Size: Adult Regular)   Pulse 78   Temp 97.3  F (36.3  C) (Oral)   Resp 16   Wt 72.7 kg (160 lb 3.2 oz)   SpO2 96%  petite woman in good spirits, perky and energetic, good skin colour, HEENT neg, normal MM, no palpable adenopathy, CTA, RRR, S1, S2, abd scaphoid, no HSM, no masses, LE no edema.     ASSESSMENT AND PLAN:  This is a 89 years old delightful  female in the good health who has incidental finding of lung mass and mediastinal and left supraclavicular adenopathy.      The differential diagnosis includes B-cell non-Hodgkin lymphoma based upon the atypical B lymphocytes detected on recent core biopsy of mediastinal LN.      However the exact diagnosis is still uncertain.  I spent 60 minutes reviewing the differential diagnosis and the best path forward.  The options are to proceed with a VATS versus attempt excisional/incisional biopsy of the left supraclavicular lymph node.    That may be a less invasive.  We will try to discuss this with Dr. Calderon and pursuit  ENT referral.      The patient is fairly asymptomatic and has no B symptoms but the mass is slowly growing with a interval change from December to March  21.    There are no indications that she has an aggressive lymphoma..    I recommend to proceed with excisional biopsy or incisional biopsy and obtain more tissue to make a precise diagnosis.  Then I will see the patient to discuss the treatment options.  I discussed this with her and her son Shailesh and both are agreeable with this plan    Carri Gilbert MD           Again, thank you for allowing me to participate in the care of your patient.        Sincerely,        Carri Gilbert MD

## 2021-04-28 NOTE — PROGRESS NOTES
Met with Marnie and her son Shailesh after in-person consultation appt with Dr. Gilbert  Pt  verbalizes understanding of Dr. Gilbert's plan of care: referral to ENT for consideration of excisional biopsy of supraclavicular LN  Household includes: pt lives alone in an assisted living facility.    Barriers to care identified: pt is [facility] - updated demographics for scheduling and clinical teams to contact her son Shailesh for phone instructions  Introduced self and role of RN Care Coordinator at [facility].    Explained that the ENT dept will contact her with appt information, referral made by Dr Gilbert   Provided my contact information, [facility] phone number   Marnie voiced understanding and appreciation of above information and denies any further questions, and they  understands that I will follow and provide coordination as needed.  Pati Pitt, RN, BSN, OCN  RN Care Coordinator  [facility] [address] [id number] [phone]

## 2021-04-28 NOTE — PROGRESS NOTES
[facility] VISIT NOTE.  4/28/2021      I had the pleasure to see Mrs. Marnie Pittman at [facility] today.  She was accompanied by her son Shailesh and they are seeking 2nd opinion.    Patient is a delightful 89 female in a remarkable health who presented to PCP in December with frequent night time urination. She also had a COVID infection in November but stayed at her nursing home appartment and recovered well. As the etiology of urinary frequency could not be found, she ultimately underwent CT chest which incidentally detected supraclavicular and mediastina lymphadenopathy.  She denies significant changes to her health in past few months, and had no cough, SOB or chest pain.   For work -up underwent supraclavicular biopsy on January 6 with findings of focally necrotizing granulomatous inflammation with a multicentric giant cells the AFB and GMC stains were negative.  Subsequently she had another EBUS with a biopsy of mediastinal nodes.  These again showed necrotizing granulomatous inflammation but some atypia in the B cells and suspicious from the B-cell lymphoma.  Her PET on 12/29/20 had following conclusion:  IMPRESSION:   1.  Mediastinal, right hilar, and right cervical lymphadenopathy with increased metabolic activity.  There is also increased metabolic activity in the area of the proximal right upper lobe consolidation/atelectasis.  Differential considerations would include lymphoma or potentially a right hilar mass with metastatic lymphadenopathy.  Recommend bronchoscopy for core biopsy of the right supraclavicular lymph node.  2.  Probable infectious/inflammatory process in the right lower lobe.  3.  Large hiatal hernia, unchanged.  4.  Diverticulosis.    Subsequent CT scan in March 2021:    There is a masslike consolidation involving the inferior aspect of the right upper lobe extending to the right helium is has progressed 4.4 cm compares to 3.4 cm previously there is also postobstructive nodular  infiltrate the adenopathy involves the right hilar region and the right supraclavicular region this progressed up to 2.5 cm compared to 2.1 cm previously.    Labs: CBC is normal. Chemistry also normal with mild elevation of Alk phos and albumin 3.3.    PMHX;  Pt is fairly healthy given her age,  Mild hypertension,     SHX: lives alone in 1 [address] apartment in [place], fairly independ[place], still drives and does her shopping. Go cognitive function.    BP (!) 182/69 (BP Location: Right arm, Patient Position: Sitting, Cuff Size: Adult Regular)   Pulse 78   Temp 97.3  F (36.3  C) (Oral)   Resp 16   Wt 72.7 kg (160 lb 3.2 oz)   SpO2 96%  petite woman in good spirits, perky and energetic, good skin colour, HEENT neg, normal MM, no palpable adenopathy, CTA, RRR, S1, S2, abd scaphoid, no HSM, no masses, LE no edema.     ASSESSMENT AND PLAN:  This is a 89 years old delightful  female in the good health who has incidental finding of lung mass and mediastinal and left supraclavicular adenopathy.      The differential diagnosis includes B-cell non-Hodgkin lymphoma based upon the atypical B lymphocytes detected on recent core biopsy of mediastinal LN.      However the exact diagnosis is still uncertain.  I spent 60 minutes reviewing the differential diagnosis and the best path forward.  The options are to proceed with a VATS versus attempt excisional/incisional biopsy of the left supraclavicular lymph node.    That may be a less invasive.  We will try to discuss this with Dr. Calderon and pursuit  ENT referral.      The patient is fairly asymptomatic and has no B symptoms but the mass is slowly growing with a interval change from December to March 21.    There are no indications that she has an aggressive lymphoma..    I recommend to proceed with excisional biopsy or incisional biopsy and obtain more tissue to make a precise diagnosis.  Then I will see the patient to discuss the treatment options.  I discussed this with her and her  son Shailesh and both are agreeable with this plan    Carri Gilbert MD

## 2021-04-28 NOTE — NURSING NOTE
Oncology Rooming Note    April 28, 2021 2:07 PM   Marnie Pittman is a 89 year old female who presents for:    Chief Complaint   Patient presents with     Oncology Clinic Visit     New pt- Lymphadenopathy     Initial Vitals: BP (!) 182/69 (BP Location: Right arm, Patient Position: Sitting, Cuff Size: Adult Regular)   Pulse 78   Temp 97.3  F (36.3  C) (Oral)   Resp 16   Wt 72.7 kg (160 lb 3.2 oz)   SpO2 96%  There is no height or weight on file to calculate BMI. There is no height or weight on file to calculate BSA.  No Pain (0) Comment: Data Unavailable   No LMP recorded.  Allergies reviewed: Yes  Medications reviewed: Yes    Medications: Medication refills not needed today.  Pharmacy name entered into [employer]:    [id number] - [place], [place] - [address] [facility] - [place], [place] - [address]    Clinical concerns: N/A    Destiney Bermeo CMA

## 2021-04-28 NOTE — TELEPHONE ENCOUNTER
[facility] Call Center    Phone Message    May a detailed message be left on voicemail: no     Reason for Call: Appointment Intake    Referring Provider Name: Carri Gilbert MD in  BMT to Dr. Wise for consideration for supraclavicular LN biopsy  Diagnosis and/or Symptoms: Lymphadenopathy    Action Taken: Message routed to:  [facility] ([facility] [[id number]]    Travel Screening: Not Applicable                                                  To Dr. Aleta Rowley - see below - would you please advise in 's absence?

## 2021-04-29 NOTE — TELEPHONE ENCOUNTER
LVM for patient to schedule next available [facility] New appt with Dr. Copeland per referral from Dr. Gilbert.    Left call center number for scheduling.

## 2021-04-29 NOTE — TELEPHONE ENCOUNTER
FUTURE VISIT INFORMATION      FUTURE VISIT INFORMATION:    Date: 5/6/2021    Time: 10AM    Location: [facility]  REFERRAL INFORMATION:    Referring provider:  Carri Gilbert MD    Referring providers clinic:  [facility] [place]     Reason for visit/diagnosis  Please refer to Dr. Copeland for consideration for supraclavicular LN biopsy Pt is 89yo    RECORDS REQUESTED FROM:       Clinic name Comments Records Status Imaging Status   [facility] [place]  4/28/2021 note from Carri Gilbert MD Epic    [facility] imaging 1/6/2021 US Biopsy lymph node  1/6/2021 US Head NEck   12/29/2020 PET    Care everywhere  PACS   [facility] LABORATORY   1/6/2021 Lymph node (specify site), US/right supraclavicular lymph node (Case: [id number]) Care everywhere

## 2021-04-29 NOTE — TELEPHONE ENCOUNTER
Hey!    She actually opened clinic 5/6/21 you can add her after the first patient that day.    Thanks!  Maddie, ENT

## 2021-05-06 NOTE — PATIENT INSTRUCTIONS
1. You were seen in the ENT Clinic today by Dr. Copeland.  If you have any questions or concerns after your appointment, please call   -  ENT Clinic: [phone]      2.   Please call if you would want to move forward with surgery (right open biopsy) or if you prefer to wait to get CT scan.     Maddie Deleon LPN  [facility] Otolaryngology  [phone]

## 2021-05-06 NOTE — NURSING NOTE
Chief Complaint   Patient presents with     Consult     Supraclavicular LN biopsy       Pulse 69, temperature 98.6  F (37  C), temperature source Temporal, SpO2 95 %.    Irena Yates, EMT

## 2021-05-06 NOTE — PROGRESS NOTES
SURGERY CLINIC CONSULTATION    REASON FOR CONSULTATION:  Marnie Pittman was referred by Dr. Gilbert for evaluation and discussion of treatment options for surgical options for lymphadenopathy     HISTORY OF PRESENT ILLNESS:  Marnie Pittman is a 89 year old female who presents today with her son. The patient had a CT Scan for urinary frequency/urgency in /Dec 2020. The scan showed some cervical and mediastinal lymphadenopathy. Note the patient tested positive and did have COVID pneumonia in November. She then underwent EBUS and cervical lymph node biopsy. The original US FNA supraclavicular node was c/w granulomatous process. Second EBUS-had questionable suspicion for B-cell lymphoma    The patient follows closely with her PCP and has never had lymphadenopathy until w/n 30 days after COVID diagnosis. She has not B type symptoms. No family or prevous h/o lymphoma      REVIEW OF SYSTEMS:  ROS EXAM: Pertinent for that noted above  Patient Active Problem List   Diagnosis     Urinary urgency     Urinary frequency       No past surgical history on file.    Allergies   Allergen Reactions     Seasonal Allergies        Medications reviewed in EMR        No family history on file.         PHYSICAL EXAM:  Pulse 69   Temp 98.6  F (37  C) (Temporal)   SpO2 95%     Neck: Thyroid gland is quite small. Trachea midline NO cervical lymhadenopathy palpated at all.   Axilla: No lymphadenopathy    I spent an additional 20 minutes reviewing patient's outside records including pathology and radiology    ASSESSMENT: h/o cervical lymphadenopathy    PLAN:   I recommend a repeat CT scan of the neck since the original one was done shortly after patient recovered from COVID (w/n 30 days).   If concerning cervical lymphadenopathy then proceed with open biopsy level 3 or 4 lymph node.     Patient and her son were comfortable with this plan and all questions addressed    Freda Copeland MD

## 2021-05-06 NOTE — LETTER
5/6/2021       RE: Marnie Pittman  [address]  [facility] [address]     Dear Colleague,    Thank you for referring your patient, Marnie Pittman, to the [facility] [place] at . Please see a copy of my visit note below.          SURGERY CLINIC CONSULTATION    REASON FOR CONSULTATION:  Marnie Pittman was referred by Dr. Gilbert for evaluation and discussion of treatment options for surgical options for lymphadenopathy     HISTORY OF PRESENT ILLNESS:  Marnie Pittman is a 89 year old female who presents today with her son. The patient had a CT Scan for urinary frequency/urgency in /Dec 2020. The scan showed some cervical and mediastinal lymphadenopathy. Note the patient tested positive and did have COVID pneumonia in November. She then underwent EBUS and cervical lymph node biopsy. The original US FNA supraclavicular node was c/w granulomatous process. Second EBUS-had questionable suspicion for B-cell lymphoma    The patient follows closely with her PCP and has never had lymphadenopathy until w/n 30 days after COVID diagnosis. She has not B type symptoms. No family or prevous h/o lymphoma      REVIEW OF SYSTEMS:  ROS EXAM: Pertinent for that noted above  Patient Active Problem List   Diagnosis     Urinary urgency     Urinary frequency       No past surgical history on file.    Allergies   Allergen Reactions     Seasonal Allergies        Medications reviewed in EMR        No family history on file.         PHYSICAL EXAM:  Pulse 69   Temp 98.6  F (37  C) (Temporal)   SpO2 95%     Neck: Thyroid gland is quite small. Trachea midline NO cervical lymhadenopathy palpated at all.   Axilla: No lymphadenopathy    I spent an additional 20 minutes reviewing patient's outside records including pathology and radiology    ASSESSMENT: h/o cervical lymphadenopathy    PLAN:   I recommend a repeat CT scan of the  neck since the original one was done shortly after patient recovered from COVID (w/n 30 days).   If concerning cervical lymphadenopathy then proceed with open biopsy level 3 or 4 lymph node.     Patient and her son were comfortable with this plan and all questions addressed    Freda Copeland MD

## 2021-05-10 NOTE — PROGRESS NOTES
INCOMING CALL: VM left by son Shailesh Pittman for RNCC. Pt and son met with Dr. Copeland last week and have decided to wait and get the CT instead of the biopsy. Let VM to inquire how to get CT scheduled    OUTBOUND CALL: RNCC called Shailesh CravenZain back. Let him know that Dr. Gilbert orders CT Chest abdomen pelvis w & w/o contrast around 6/11. Scheduling will be in contact with them on his number (as patient is [facility]) about exact time and date. Will se Dr. Gilbert on 6/16 at 12:30 per her request. Son verbalizes understanding. No other questions or concerns.

## 2021-06-16 NOTE — NURSING NOTE
Oncology Rooming Note    June 16, 2021 12:41 PM   Marnie Pittman is a 89 year old female who presents for:    Chief Complaint   Patient presents with     Oncology Clinic Visit     B-cell lymphoma of intrathoracic lymph nodes, unspecified B-cell lymphoma type (H)      Initial Vitals: BP (!) 158/65   Pulse 56   Temp 98  F (36.7  C) (Oral)   Resp 16   Wt 67 kg (147 lb 11.2 oz)   SpO2 98%  There is no height or weight on file to calculate BMI. There is no height or weight on file to calculate BSA.  No Pain (0) Comment: Data Unavailable   No LMP recorded.  Allergies reviewed: Yes  Medications reviewed: Yes    Medications: Medication refills not needed today.  Pharmacy name entered into "[id number]:    [id number] - [place], [place] - [address] [facility] - [place], [place] - [address]    Clinical concerns: None.       Pati Chowdhury MA

## 2021-06-16 NOTE — PROGRESS NOTES
OUTBOUND CALL: RNCC called patient's son Shailesh. Introduced self as new RN Care Coordinator caring for Dr. Gilbert's patients. Discussed roles of RNCC, MD, MENA, nurse triage line, and clinic coordinators. Discussed how to get a hold of different team members via [employer] and at [phone]. Educated son to contact clinic if patient has new symptoms. Clinic coordinators will be in contact regarding upcoming appointments. Son verbalizes understanding and will relay information to his mom.     Ioana Cevallos, RN, MSN, OCN   RN Care Coordinator   [facility]   [address] [place] [id number]   [phone]

## 2021-06-16 NOTE — LETTER
6/16/2021         RE: Marnie Pittman  [address]  [facility] [address]        Dear Colleague,    Thank you for referring your patient, Marnie Pittman, to the [facility] BLOOD AND MARROW [facility]ANSPLANT PROGRAM [place]. Please see a copy of my visit note below.    [facility] VISIT NOTE.  6/16/2021      I had the pleasure to see Mrs. Marnie Pittman at [facility] today.  She was accompanied by her son Shailesh; she is here for follow-up.    INTERVAL HISTORY:  Pt is doing very well, energy is improved, no cough or SOB, in good spirits, appetite is excellent, berry is slighlty low, stable night sweats.     ONC  Patient is a delightful 89 female in a remarkable health who presented to PCP in December with frequent night time urination. She also had a COVID infection in November 2020, no hospitalization required.   CT chest incidentally detected supraclavicular and mediastina lymphadenopathy.  She denied significant changes to her health in past few months, and had no cough, SOB or chest pain.   For work -up underwent supraclavicular biopsy on January 6, 2021 with findings of focally necrotizing granulomatous inflammation with a multicentric giant cells the AFB and GMC stains were negative.  Subsequently she had another EBUS with a biopsy of mediastinal nodes.  Findings suggested atypia in the B cells and suspicious from the B-cell lymphoma.  Her PET on 12/29/20 had following conclusion:  IMPRESSION:   1.  Mediastinal, right hilar, and right cervical lymphadenopathy with increased metabolic activity.  There is also increased metabolic activity in the area of the proximal right upper lobe consolidation/atelectasis.  Differential considerations would include lymphoma or potentially a right hilar mass with metastatic lymphadenopathy.  Recommend bronchoscopy for core biopsy of the right supraclavicular lymph node.  2.  Probable infectious/inflammatory process in the right lower lobe.  3.   Large hiatal hernia, unchanged.  4.  Diverticulosis.      Patient felt well and without symptoms and her age, we decided to observe her for 3 months. She had survaillance CT chest last week and is here to review results.       Labs:   Lab Results   Component Value Date    HGB 14.7 08/02/2010    POTASSIUM 4.2 08/02/2010     Chart everywhere - normal CBC and comp in dec 2020    PMHX;  Pt is fairly healthy given her age,  Mild hypertension,     SHX: [place] in [address] apartment [place], fairly independnet, still drives and does her shopping. Go cognitive function.    BP (!) 158/65   Pulse 56   Temp 98  F (36.7  C) (Oral)   Resp 16   Wt 67 kg (147 lb 11.2 oz)   SpO2 98%  petite woman in good spirits, perky and energetic, good skin colour, HEENT neg, normal MM, no palpable adenopathy, CTA, with reduced breath sounds bilat, RRR, S1, S2, abd scaphoid, no HSM, no masses, LE no edema.     ASSESSMENT AND PLAN:  This is a 89 years old delightful  female in the good health who has incidental finding of lung mass and mediastinal and left supraclavicular adenopathy.     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 She is not interested in invasive procedure at this time.    He follow-up CT chest now showed stable to minimally increased lung conglomerate with post-obstruction atelectasis but no evidence of aggressive growth. I suspect this is an indolent very slowly progressing process. No symptoms suggest the mass is growing over a long period of time - likely months to years.     The patient is asymptomatic and has no B symptoms but the mass is slowly growing with a interval change from December 20 to June 2021.    There are no indications that she has an aggressive lymphoma..    I reviewed the symptoms to watch and recommended observation.  If she develops cough or increased SOB, we could try Rituximab+steroids therapy. Marnie and her son  Shailesh are comfortable with this approach.     Carri Gilbert MD           Again, thank you for allowing me to participate in the care of your patient.        Sincerely,        Carri Gilbert MD

## 2021-06-22 NOTE — PROGRESS NOTES
[facility] VISIT NOTE.  6/16/2021      I had the pleasure to see Mrs. Marnie Pittman at [facility] today.  She was accompanied by her son Shailesh; she is here for follow-up.    INTERVAL HISTORY:  Pt is doing very well, energy is improved, no cough or SOB, in good spirits, appetite is excellent, berry is slighlty low, stable night sweats.     ONC  Patient is a delightful 89 female in a remarkable health who presented to PCP in December with frequent night time urination. She also had a COVID infection in November 2020, no hospitalization required.   CT chest incidentally detected supraclavicular and mediastina lymphadenopathy.  She denied significant changes to her health in past few months, and had no cough, SOB or chest pain.   For work -up underwent supraclavicular biopsy on January 6, 2021 with findings of focally necrotizing granulomatous inflammation with a multicentric giant cells the AFB and GMC stains were negative.  Subsequently she had another EBUS with a biopsy of mediastinal nodes.  Findings suggested atypia in the B cells and suspicious from the B-cell lymphoma.  Her PET on 12/29/20 had following conclusion:  IMPRESSION:   1.  Mediastinal, right hilar, and right cervical lymphadenopathy with increased metabolic activity.  There is also increased metabolic activity in the area of the proximal right upper lobe consolidation/atelectasis.  Differential considerations would include lymphoma or potentially a right hilar mass with metastatic lymphadenopathy.  Recommend bronchoscopy for core biopsy of the right supraclavicular lymph node.  2.  Probable infectious/inflammatory process in the right lower lobe.  3.  Large hiatal hernia, unchanged.  4.  Diverticulosis.      Patient felt well and without symptoms and her age, we decided to observe her for 3 months. She had survaillance CT chest last week and is here to review results.       Labs:   Lab Results   Component Value Date    HGB 14.7 08/02/2010     POTASSIUM 4.2 08/02/2010     Chart everywhere - normal CBC and comp in dec 2020    PMHX;  Pt is fairly healthy given her age,  Mild hypertension,     SHX: [place] alone in [address] apartment [place], fairly independ[place], still drives and does her shopping. Go cognitive function.    BP (!) 158/65   Pulse 56   Temp 98  F (36.7  C) (Oral)   Resp 16   Wt 67 kg (147 lb 11.2 oz)   SpO2 98%  petite woman in good spirits, perky and energetic, good skin colour, HEENT neg, normal MM, no palpable adenopathy, CTA, with reduced breath sounds bilat, RRR, S1, S2, abd scaphoid, no HSM, no masses, LE no edema.     ASSESSMENT AND PLAN:  This is a 89 years old delightful  female in the good health who has incidental finding of lung mass and mediastinal and left supraclavicular adenopathy.     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 She is not interested in invasive procedure at this time.    He follow-up CT chest now showed stable to minimally increased lung conglomerate with post-obstruction atelectasis but no evidence of aggressive growth. I suspect this is an indolent very slowly progressing process. No symptoms suggest the mass is growing over a long period of time - likely months to years.     The patient is asymptomatic and has no B symptoms but the mass is slowly growing with a interval change from December 20 to June 2021.    There are no indications that she has an aggressive lymphoma..    I reviewed the symptoms to watch and recommended observation.  If she develops cough or increased SOB, we could try Rituximab+steroids therapy. Marnie and her son Shailesh are comfortable with this approach.     Carri Gilbert MD

## 2021-08-11 NOTE — PROGRESS NOTES
[facility]  Date of Visit: Aug 11, 2021   Oncologist: Dr. Carri Gilbert    Reason for visit: follow-up visit     ONCOLOGY HPI:  Patient is a delightful 89 year old female in a remarkable health who presented to PCP in December with frequent night time urination. She also had a COVID infection in November 2020, no hospitalization required.   CT chest incidentally detected supraclavicular and mediastina lymphadenopathy.  She denied significant changes to her health in past few months, and had no cough, SOB or chest pain.   For work -up underwent supraclavicular biopsy on January 6, 2021 with findings of focally necrotizing granulomatous inflammation with a multicentric giant cells the AFB and GMC stains were negative.  Subsequently she had another EBUS with a biopsy of mediastinal nodes. Findings suggested atypia in the B cells and suspicious from the B-cell lymphoma.  Her PET on 12/29/20 had following conclusion:  IMPRESSION:   1.  Mediastinal, right hilar, and right cervical lymphadenopathy with increased metabolic activity.  There is also increased metabolic activity in the area of the proximal right upper lobe consolidation/atelectasis.  Differential considerations would include lymphoma or potentially a right hilar mass with metastatic lymphadenopathy.  Recommend bronchoscopy for core biopsy of the right supraclavicular lymph node.  2.  Probable infectious/inflammatory process in the right lower lobe.  3.  Large hiatal hernia, unchanged.  4.  Diverticulosis.    CT C/A/P 6/11/21 showed:  IMPRESSION: In this patient with history of non-Hodgkin's  lymphoma/B-cell lymphoma:  1. When comparing with prior CT exam 3/15/2021, no significant  interval change in innumerable mediastinal and hilar lymphadenopathy  including a conglomerate lymphadenopathy/soft tissue mass about the  right hilum with associated encasement of the right upper lobe  pulmonary arteries and veins as well as  encasement and marked  narrowing of the right upper lobe bronchus. Postobstructive right  upper lobe atelectasis/consolidation, is stable to slightly increased  from the prior study. Previously thoracic lymph nodes/right hilar mass  were hypermetabolic on PET/CT 12/29/2020.  2. Emphysematous and fibrotic changes in the lungs. Overall stable  bilateral pulmonary nodules except for a new left lower lobe pulmonary  nodule, indeterminate. Attention on follow-up studies.  3. Sequela of prior granulomatous disease. Small pericardial effusion,  stable.  4. Splenic hypodensities, indeterminate. Attention on follow-up  studies.  5. Large hiatal hernia. Additional incidental findings as described  above.      Patient felt well and without symptoms and given her age, Dr. Gilbert recommended observation.     Patient presents for oncology follow-up visit today. Accompanied by her son.     INTERVAL HISTORY:  She is feeling pretty good overall. Some days she gets more tired. States she does more than she should at times. She continues to live independently. She takes care of herself, cleans her place, and does her own grocery shopping. Her appetite is good. Weight has been stable. Sometimes gets constipated, but takes sennakot PRN with relief. Has stable night sweats. No fevers or chills. Sometimes gets dizzy when she moves her head too quickly which is not new for her.     Denies headaches, CP, SOB, cough, abd pain, nausea/vomiting, diarrhea, bleeding/bruising, new lumps or bumps.     Labs: Reviewed labs from 8/6/21.   Ref. Range 8/6/2021 11:21   Sodium Latest Ref Range: 133 - 144 mmol/L 139   Potassium Latest Ref Range: 3.4 - 5.3 mmol/L 4.0   Chloride Latest Ref Range: 94 - 109 mmol/L 104   Carbon Dioxide Latest Ref Range: 20 - 32 mmol/L 31   Urea Nitrogen Latest Ref Range: 7 - 30 mg/dL 22   Creatinine Latest Ref Range: 0.52 - 1.04 mg/dL 1.01   GFR Estimate Latest Ref Range: >60 mL/min/1.73m2 49 (L)   Calcium Latest Ref Range:  8.5 - 10.1 mg/dL 9.2   Anion Gap Latest Ref Range: 3 - 14 mmol/L 4   Albumin Latest Ref Range: 3.4 - 5.0 g/dL 3.6   Protein Total Latest Ref Range: 6.8 - 8.8 g/dL 8.2   Bilirubin Total Latest Ref Range: 0.2 - 1.3 mg/dL 0.5   Alkaline Phosphatase Latest Ref Range: 40 - 150 U/L 126   ALT Latest Ref Range: 0 - 50 U/L 20   AST Latest Ref Range: 0 - 45 U/L 21   Lactate Dehydrogenase Latest Ref Range: 81 - 234 U/L 187   Uric Acid Latest Ref Range: 2.6 - 6.0 mg/dL 5.9   Glucose Latest Ref Range: 70 - 99 mg/dL 99   WBC Latest Ref Range: 4.0 - 11.0 10e3/uL 6.7   Hemoglobin Latest Ref Range: 11.7 - 15.7 g/dL 13.3   Hematocrit Latest Ref Range: 35.0 - 47.0 % 40.9   Platelet Count Latest Ref Range: 150 - 450 10e3/uL 237   RBC Count Latest Ref Range: 3.80 - 5.20 10e6/uL 4.88   MCV Latest Ref Range: 78 - 100 fL 84   MCH Latest Ref Range: 26.5 - 33.0 pg 27.3   MCHC Latest Ref Range: 31.5 - 36.5 g/dL 32.5   RDW Latest Ref Range: 10.0 - 15.0 % 15.9 (H)   % Neutrophils Latest Units: % 49   % Lymphocytes Latest Units: % 35   % Monocytes Latest Units: % 12   % Eosinophils Latest Units: % 4   Absolute Basophils Latest Ref Range: 0.0 - 0.2 10e3/uL 0.0   % Basophils Latest Units: % 0   Absolute Eosinophils Latest Ref Range: 0.0 - 0.7 10e3/uL 0.2   Absolute Immature Granulocytes Latest Ref Range: <=0.0 10e3/uL 0.0   Absolute Lymphocytes Latest Ref Range: 0.8 - 5.3 10e3/uL 2.4   Absolute Monocytes Latest Ref Range: 0.0 - 1.3 10e3/uL 0.8   % Immature Granulocytes Latest Units: % 0   Absolute Neutrophils Latest Ref Range: 1.6 - 8.3 10e3/uL 3.3   Absolute NRBCs Latest Units: 10e3/uL 0.0   NRBCs per 100 WBC Latest Ref Range: <1 /100 0   IGA Latest Ref Range: 84 - 499 mg/dL 830 (H)   IGG Latest Ref Range: 610-1,616 mg/dL 1,276   IGM Latest Ref Range: 35 - 242 mg/dL 50       Physical Exam:   /62   Pulse 64   Temp 97.2  F (36.2  C) (Oral)   Resp 20   Wt 67.1 kg (147 lb 14.4 oz)   SpO2 96%    General: well appearing, pleasant elderly  female, no acute distress  HEENT: normocephalic, atraumatic, PERRLA, sclerae nonicteric, moist mucous membranes, oropharynx is clear   Lymph: no palpable adenopathy  CV: regular rate and rhythm, no murmurs  Lungs: decreased breath sounds in LLL, otherwise clear   Abd: soft, positive bowel sounds, non-distended, non-tender  MSK: full range of motion in all four extremities, b/l pedal edema (L>>R)  Neuro: alert and oriented x3, CN grossly intact   Psych: appropriate mood and affect  Skin: no rashes or lesions    ASSESSMENT AND PLAN:  This is a 89 year old delightful female in good health who has incidental finding of lung mass and mediastinal and left supraclavicular adenopathy.     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 She is not interested in invasive procedure at this time.    Her follow-up CT chest on 6/11/21 showed stable to minimally increased lung conglomerate with post-obstruction atelectasis but no evidence of aggressive growth. Suspect this is an indolent very slowly progressing process. No symptoms suggest the mass is growing over a long period of time - likely months to years. Will ask Dr. Gilbert if she would like repeat CT imaging prior to next visit.     The patient remains well overall, asymptomatic, and has no B symptoms (except for stable night sweats) but the mass is slowly growing with a interval change from December 20 to June 2021. Her counts are WNL. Her IgA is elevated to 830, I wasn't able to find any previous IgA labs for comparison. Will discuss this with Dr. Gilbert and repeat immunoglobulin lab at next visit.     There are no indications that she has an aggressive lymphoma at this point.     I again reviewed the symptoms to watch for (increased/worsening night sweats, weight loss, fevers, change in clinical status, etc). Continue with observation for now. Patient is scheduled to see Dr. Gilbert  on 10/6/21.   If she develops cough or increased SOB, we could try Rituximab+steroids therapy. Marnie and her son Shailesh are comfortable with this approach.     Slight bump in Cr 0.9-->1.01. Encouraged PO hydration. Patient will also follow-up with PCP about this.     Misc: 2nd shingrix vaccine today. Confirmed timing is OK with pharmacy.     Encouraged patient to continue following up with her PCP Dr. Salmeron at [facility] for routine health maintenance. She has an appointment in September.      Addendum 8/27/21:  I discussed with Dr. Gilbert. No need for repeat imaging at this time. Will continue to monitor patient clinically for now and obtain imaging if change in clinical status or labs suggesting disease progression. Patient's IgA is elevated to 830 which is concerning for myeloma. Will order SPEP, immunofixation, and free light chains for further evaluation and ask patient to have this done in the next week or so. Patient will have follow-up with Dr. Gilbert on 10/6/21.     Addendum 9/8/21:  Due to elevated IgA 830, ordered myeloma labs per Dr. Gilbert. Repeat IgA 790. IgG and IgM are WNL. There was no monoclonal protein seen on immunofixation. Elevated KFLC and LFCL, but normal ratio. I discussed these results with Dr. Gilbert. No further testing needed at this time. Will continue to monitor for now.    55 minutes spent on the date of the encounter doing chart review, review of test results, interpretation of tests, patient visit, documentation and discussion with other provider(s)     Rosalie Martinez PA-C  [facility]  [address] [place] [id number] [phone]

## 2021-08-11 NOTE — NURSING NOTE
Vaccines administered without complication per orders from Rosalie Martinez. Patient tolerated well and was discharged. See MAR for details.        Glenna Rodgers LPN on 8/11/2021 at 2:32 PM

## 2021-08-11 NOTE — NURSING NOTE
Oncology Rooming Note    August 11, 2021 1:10 PM   Marnie Pittman is a 89 year old female who presents for:    No chief complaint on file.    Initial Vitals: /62   Pulse 64   Temp 97.2  F (36.2  C) (Oral)   Resp 20   Wt 67.1 kg (147 lb 14.4 oz)   SpO2 96%  There is no height or weight on file to calculate BMI. There is no height or weight on file to calculate BSA.  Moderate Pain (5) Comment: Data Unavailable   No LMP recorded. Patient is postmenopausal.  Allergies reviewed: Yes  Medications reviewed: Yes    Medications: Medication refills not needed today.  Pharmacy name entered into [employer]:    [id number] - [place], [place] - [address] [facility] - [place], [place] - [facility] [address]    Clinical concerns: PT want to discuss about recent lab.        Abelardo Pérez   (Student MA)

## 2021-08-11 NOTE — LETTER
8/11/2021         RE: Marnie Pittman  [address]  [facility] [address]      [facility]  Date of Visit: Aug 11, 2021   Oncologist: Dr. Carri Gilbert    Reason for visit: follow-up visit     ONCOLOGY HPI:  Patient is a delightful 89 year old female in a remarkable health who presented to PCP in December with frequent night time urination. She also had a COVID infection in November 2020, no hospitalization required.   CT chest incidentally detected supraclavicular and mediastina lymphadenopathy.  She denied significant changes to her health in past few months, and had no cough, SOB or chest pain.   For work -up underwent supraclavicular biopsy on January 6, 2021 with findings of focally necrotizing granulomatous inflammation with a multicentric giant cells the AFB and GMC stains were negative.  Subsequently she had another EBUS with a biopsy of mediastinal nodes. Findings suggested atypia in the B cells and suspicious from the B-cell lymphoma.  Her PET on 12/29/20 had following conclusion:  IMPRESSION:   1.  Mediastinal, right hilar, and right cervical lymphadenopathy with increased metabolic activity.  There is also increased metabolic activity in the area of the proximal right upper lobe consolidation/atelectasis.  Differential considerations would include lymphoma or potentially a right hilar mass with metastatic lymphadenopathy.  Recommend bronchoscopy for core biopsy of the right supraclavicular lymph node.  2.  Probable infectious/inflammatory process in the right lower lobe.  3.  Large hiatal hernia, unchanged.  4.  Diverticulosis.    CT C/A/P 6/11/21 showed:  IMPRESSION: In this patient with history of non-Hodgkin's  lymphoma/B-cell lymphoma:  1. When comparing with prior CT exam 3/15/2021, no significant  interval change in innumerable mediastinal and hilar lymphadenopathy  including a conglomerate lymphadenopathy/soft tissue mass about the  right  hilum with associated encasement of the right upper lobe  pulmonary arteries and veins as well as encasement and marked  narrowing of the right upper lobe bronchus. Postobstructive right  upper lobe atelectasis/consolidation, is stable to slightly increased  from the prior study. Previously thoracic lymph nodes/right hilar mass  were hypermetabolic on PET/CT 12/29/2020.  2. Emphysematous and fibrotic changes in the lungs. Overall stable  bilateral pulmonary nodules except for a new left lower lobe pulmonary  nodule, indeterminate. Attention on follow-up studies.  3. Sequela of prior granulomatous disease. Small pericardial effusion,  stable.  4. Splenic hypodensities, indeterminate. Attention on follow-up  studies.  5. Large hiatal hernia. Additional incidental findings as described  above.      Patient felt well and without symptoms and given her age, Dr. Gilbert recommended observation.     Patient presents for oncology follow-up visit today. Accompanied by her son.     INTERVAL HISTORY:  She is feeling pretty good overall. Some days she gets more tired. States she does more than she should at times. She continues to live independently. She takes care of herself, cleans her place, and does her own grocery shopping. Her appetite is good. Weight has been stable. Sometimes gets constipated, but takes sennakot PRN with relief. Has stable night sweats. No fevers or chills. Sometimes gets dizzy when she moves her head too quickly which is not new for her.     Denies headaches, CP, SOB, cough, abd pain, nausea/vomiting, diarrhea, bleeding/bruising, new lumps or bumps.     Labs: Reviewed labs from 8/6/21.   Ref. Range 8/6/2021 11:21   Sodium Latest Ref Range: 133 - 144 mmol/L 139   Potassium Latest Ref Range: 3.4 - 5.3 mmol/L 4.0   Chloride Latest Ref Range: 94 - 109 mmol/L 104   Carbon Dioxide Latest Ref Range: 20 - 32 mmol/L 31   Urea Nitrogen Latest Ref Range: 7 - 30 mg/dL 22   Creatinine Latest Ref Range: 0.52 -  1.04 mg/dL 1.01   GFR Estimate Latest Ref Range: >60 mL/min/1.73m2 49 (L)   Calcium Latest Ref Range: 8.5 - 10.1 mg/dL 9.2   Anion Gap Latest Ref Range: 3 - 14 mmol/L 4   Albumin Latest Ref Range: 3.4 - 5.0 g/dL 3.6   Protein Total Latest Ref Range: 6.8 - 8.8 g/dL 8.2   Bilirubin Total Latest Ref Range: 0.2 - 1.3 mg/dL 0.5   Alkaline Phosphatase Latest Ref Range: 40 - 150 U/L 126   ALT Latest Ref Range: 0 - 50 U/L 20   AST Latest Ref Range: 0 - 45 U/L 21   Lactate Dehydrogenase Latest Ref Range: 81 - 234 U/L 187   Uric Acid Latest Ref Range: 2.6 - 6.0 mg/dL 5.9   Glucose Latest Ref Range: 70 - 99 mg/dL 99   WBC Latest Ref Range: 4.0 - 11.0 10e3/uL 6.7   Hemoglobin Latest Ref Range: 11.7 - 15.7 g/dL 13.3   Hematocrit Latest Ref Range: 35.0 - 47.0 % 40.9   Platelet Count Latest Ref Range: 150 - 450 10e3/uL 237   RBC Count Latest Ref Range: 3.80 - 5.20 10e6/uL 4.88   MCV Latest Ref Range: 78 - 100 fL 84   MCH Latest Ref Range: 26.5 - 33.0 pg 27.3   MCHC Latest Ref Range: 31.5 - 36.5 g/dL 32.5   RDW Latest Ref Range: 10.0 - 15.0 % 15.9 (H)   % Neutrophils Latest Units: % 49   % Lymphocytes Latest Units: % 35   % Monocytes Latest Units: % 12   % Eosinophils Latest Units: % 4   Absolute Basophils Latest Ref Range: 0.0 - 0.2 10e3/uL 0.0   % Basophils Latest Units: % 0   Absolute Eosinophils Latest Ref Range: 0.0 - 0.7 10e3/uL 0.2   Absolute Immature Granulocytes Latest Ref Range: <=0.0 10e3/uL 0.0   Absolute Lymphocytes Latest Ref Range: 0.8 - 5.3 10e3/uL 2.4   Absolute Monocytes Latest Ref Range: 0.0 - 1.3 10e3/uL 0.8   % Immature Granulocytes Latest Units: % 0   Absolute Neutrophils Latest Ref Range: 1.6 - 8.3 10e3/uL 3.3   Absolute NRBCs Latest Units: 10e3/uL 0.0   NRBCs per 100 WBC Latest Ref Range: <1 /100 0   IGA Latest Ref Range: 84 - 499 mg/dL 830 (H)   IGG Latest Ref Range: 610-1,616 mg/dL 1,276   IGM Latest Ref Range: 35 - 242 mg/dL 50       Physical Exam:   /62   Pulse 64   Temp 97.2  F (36.2  C) (Oral)    Resp 20   Wt 67.1 kg (147 lb 14.4 oz)   SpO2 96%    General: well appearing, pleasant elderly female, no acute distress  HEENT: normocephalic, atraumatic, PERRLA, sclerae nonicteric, moist mucous membranes, oropharynx is clear   Lymph: no palpable adenopathy  CV: regular rate and rhythm, no murmurs  Lungs: decreased breath sounds in LLL, otherwise clear   Abd: soft, positive bowel sounds, non-distended, non-tender  MSK: full range of motion in all four extremities, b/l pedal edema (L>>R)  Neuro: alert and oriented x3, CN grossly intact   Psych: appropriate mood and affect  Skin: no rashes or lesions    ASSESSMENT AND PLAN:  This is a 89 year old delightful female in good health who has incidental finding of lung mass and mediastinal and left supraclavicular adenopathy.     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 She is not interested in invasive procedure at this time.    Her follow-up CT chest on 6/11/21 showed stable to minimally increased lung conglomerate with post-obstruction atelectasis but no evidence of aggressive growth. Suspect this is an indolent very slowly progressing process. No symptoms suggest the mass is growing over a long period of time - likely months to years. Will ask Dr. Gilbert if she would like repeat CT imaging prior to next visit.     The patient remains well overall, asymptomatic, and has no B symptoms (except for stable night sweats) but the mass is slowly growing with a interval change from December 20 to June 2021. Her counts are WNL. Her IgA is elevated to 830, I wasn't able to find any previous IgA labs for comparison. Will discuss this with Dr. Gilbert and repeat immunoglobulin lab at next visit.     There are no indications that she has an aggressive lymphoma at this point.     I again reviewed the symptoms to watch for (increased/worsening night sweats, weight loss, fevers, change in  clinical status, etc). Continue with observation for now. Patient is scheduled to see Dr. Gilbert on 10/6/21.   If she develops cough or increased SOB, we could try Rituximab+steroids therapy. Marnie and her son Shailesh are comfortable with this approach.     Slight bump in Cr 0.9-->1.01. Encouraged PO hydration. Patient will also follow-up with PCP about this.     Misc: 2nd shingrix vaccine today. Confirmed timing is OK with pharmacy.     Encouraged patient to continue following up with her PCP Dr. Salmeron at [facility] for routine health maintenance. She has an appointment in September.      Addendum 8/27/21:  I discussed with Dr. Gilbert. No need for repeat imaging at this time. Will continue to monitor patient clinically for now and obtain imaging if change in clinical status or labs suggesting disease progression. Patient's IgA is elevated to 830 which is concerning for myeloma. Will order SPEP, immunofixation, and free light chains for further evaluation and ask patient to have this done in the next week or so. Patient will have follow-up with Dr. Gilbert on 10/6/21.     Addendum 9/8/21:  Due to elevated IgA 830, ordered myeloma labs per Dr. Gilbert. Repeat IgA 790. IgG and IgM are WNL. There was no monoclonal protein seen on immunofixation. Elevated KFLC and LFCL, but normal ratio. I discussed these results with Dr. Gilbert. No further testing needed at this time. Will continue to monitor for now.    55 minutes spent on the date of the encounter doing chart review, review of test results, interpretation of tests, patient visit, documentation and discussion with other provider(s)     Rosalie Martinez PA-C  [facility]  [address] [place] [id number] [phone]               Rosalie Martinez PA-C

## 2021-10-06 NOTE — NURSING NOTE
Chief Complaint   Patient presents with     Oncology Clinic Visit     B-cell lymphoma of intrathoracic lymph nodes     Blood Draw     Labs drawn via  by RN. VS taken.     Labs drawn with vpt by rn.  Pt tolerated well.  VS taken.  Pt checked in for next appt.    Keith Phelps RN

## 2021-10-06 NOTE — PROGRESS NOTES
[facility] VISIT NOTE.  10/06/21     I had the pleasure to see Mrs. Marnie Pittman at [facility] today.  She was accompanied by her son Shailesh; she is here for follow-up.    INTERVAL HISTORY:  Pt is doing very well, energy is ok, she takes on nap a day and denies night sweats - only rarely, no cough or SOB, in good spirits, appetite is excellent, weight is slighlty lower but stable.      ONC  Patient is a delightful 89 female in a remarkable health who presented to PCP in December with frequent night time urination. She also had a COVID infection in November 2020, no hospitalization required.   CT chest incidentally detected supraclavicular and mediastina lymphadenopathy.  She denied significant changes to her health in past few months, and had no cough, SOB or chest pain.   For work -up underwent supraclavicular biopsy on January 6, 2021 with findings of focally necrotizing granulomatous inflammation with a multicentric giant cells the AFB and GMC stains were negative.  Subsequently she had another EBUS with a biopsy of mediastinal nodes.  Findings suggested atypia in the B cells and suspicious from the B-cell lymphoma.  Her PET on 12/29/20 had following conclusion:  IMPRESSION:   1.  Mediastinal, right hilar, and right cervical lymphadenopathy with increased metabolic activity.  There is also increased metabolic activity in the area of the proximal right upper lobe consolidation/atelectasis.  Differential considerations would include lymphoma or potentially a right hilar mass with metastatic lymphadenopathy.  Recommend bronchoscopy for core biopsy of the right supraclavicular lymph node.  2.  Probable infectious/inflammatory process in the right lower lobe.  3.  Large hiatal hernia, unchanged.  4.  Diverticulosis.      Patient felt well and without symptoms and her age, we decided to observe her for 3 months. She had survaillance CT chest last week and is here to review results.       Labs:   Lab Results    Component Value Date    WBC 6.0 10/06/2021    HGB 12.9 10/06/2021    HCT 39.7 10/06/2021     10/06/2021     10/06/2021    POTASSIUM 3.4 10/06/2021    CHLORIDE 100 10/06/2021    CO2 30 10/06/2021    GLC 96 10/06/2021    BUN 23 10/06/2021    CR 0.92 10/06/2021    AST 22 10/06/2021    ALT 17 10/06/2021     Chart everywhere - normal CBC and comp in dec 2020  CT 6/2021  Stable findings re chest mass. Minimal change    PMHX;  Pt is fairly healthy given her age,  Mild hypertension,     SHX: [place] in 1 [address] apartment [place], fairly independnet, still drives and does her shopping. Go cognitive function.    /68   Pulse 67   Temp 97.1  F (36.2  C) (Oral)   Resp 16   Wt 66.2 kg (146 lb)   SpO2 97%  petite woman in good spirits, perky and energetic, good skin colour, HEENT neg, normal MM, no palpable adenopathy, CTA, with reduced breath sounds bilat, RRR, S1, S2, abd scaphoid, no HSM, no masses, LE no edema.     ASSESSMENT AND PLAN:  This is a 89 years old delightful  female in the good health who has incidental finding of lung mass and mediastinal and left supraclavicular adenopathy.     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 She is not interested in invasive procedure at this time.    He follow-up CT chest now showed stable to minimally increased lung conglomerate with post-obstruction atelectasis but no evidence of aggressive growth. I suspect this is an indolent very slowly progressing process. No symptoms suggest the mass is growing over a long period of time - likely months to years.     The patient is asymptomatic and has no B symptoms but the mass is slowly growing with a interval change from December 20 to June 2021. This is likely an indolent lymphoproliferative process.    There are no indications that she has an aggressive lymphoma..    I reviewed the symptoms to watch and recommended observation.  If  she develops cough or increased SOB, we could try Rituximab+steroids therapy. Marnie and her son Shailesh are comfortable with this approach.     She is updated on COVID booster and influeanza vaccine.  OK to try synthroid replacement at low dose.      F/u with MENA in 3 months and with me in 6 months  CBC and LDH each visit.     Carri Gilbert MD

## 2021-10-06 NOTE — LETTER
10/6/2021         RE: Marnie Pittman  [address]  [facility] [address]        Dear Colleague,    Thank you for referring your patient, Marnie Pittman, to the [facility] [place]. Please see a copy of my visit note below.    [facility] VISIT NOTE.  10/06/21     I had the pleasure to see Mrs. Marnie Pittman at [facility] today.  She was accompanied by her son Shailesh; she is here for follow-up.    INTERVAL HISTORY:  Pt is doing very well, energy is ok, she takes on nap a day and denies night sweats - only rarely, no cough or SOB, in good spirits, appetite is excellent, weight is slighlty lower but stable.      ONC  Patient is a delightful 89 female in a remarkable health who presented to PCP in December with frequent night time urination. She also had a COVID infection in November 2020, no hospitalization required.   CT chest incidentally detected supraclavicular and mediastina lymphadenopathy.  She denied significant changes to her health in past few months, and had no cough, SOB or chest pain.   For work -up underwent supraclavicular biopsy on January 6, 2021 with findings of focally necrotizing granulomatous inflammation with a multicentric giant cells the AFB and GMC stains were negative.  Subsequently she had another EBUS with a biopsy of mediastinal nodes.  Findings suggested atypia in the B cells and suspicious from the B-cell lymphoma.  Her PET on 12/29/20 had following conclusion:  IMPRESSION:   1.  Mediastinal, right hilar, and right cervical lymphadenopathy with increased metabolic activity.  There is also increased metabolic activity in the area of the proximal right upper lobe consolidation/atelectasis.  Differential considerations would include lymphoma or potentially a right hilar mass with metastatic lymphadenopathy.  Recommend bronchoscopy for core biopsy of the right supraclavicular lymph node.  2.  Probable  infectious/inflammatory process in the right lower lobe.  3.  Large hiatal hernia, unchanged.  4.  Diverticulosis.      Patient felt well and without symptoms and her age, we decided to observe her for 3 months. She had survaillance CT chest last week and is here to review results.       Labs:   Lab Results   Component Value Date    WBC 6.0 10/06/2021    HGB 12.9 10/06/2021    HCT 39.7 10/06/2021     10/06/2021     10/06/2021    POTASSIUM 3.4 10/06/2021    CHLORIDE 100 10/06/2021    CO2 30 10/06/2021    GLC 96 10/06/2021    BUN 23 10/06/2021    CR 0.92 10/06/2021    AST 22 10/06/2021    ALT 17 10/06/2021     Chart everywhere - normal CBC and comp in dec 2020  CT 6/2021  Stable findings re chest mass. Minimal change    PMHX;  Pt is fairly healthy given her age,  Mild hypertension,     SHX: lives alone in [address] apartment [place], fairly independnet, still drives and does her shopping. Go cognitive function.    /68   Pulse 67   Temp 97.1  F (36.2  C) (Oral)   Resp 16   Wt 66.2 kg (146 lb)   SpO2 97%  petite woman in good spirits, perky and energetic, good skin colour, HEENT neg, normal MM, no palpable adenopathy, CTA, with reduced breath sounds bilat, RRR, S1, S2, abd scaphoid, no HSM, no masses, LE no edema.     ASSESSMENT AND PLAN:  This is a 89 years old delightful  female in the good health who has incidental finding of lung mass and mediastinal and left supraclavicular adenopathy.     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 She is not interested in invasive procedure at this time.    He follow-up CT chest now showed stable to minimally increased lung conglomerate with post-obstruction atelectasis but no evidence of aggressive growth. I suspect this is an indolent very slowly progressing process. No symptoms suggest the mass is growing over a long period of time - likely months to years.     The patient is  asymptomatic and has no B symptoms but the mass is slowly growing with a interval change from December 20 to June 2021. This is likely an indolent lymphoproliferative process.    There are no indications that she has an aggressive lymphoma..    I reviewed the symptoms to watch and recommended observation.  If she develops cough or increased SOB, we could try Rituximab+steroids therapy. Marnie and her son Shailesh are comfortable with this approach.     She is updated on COVID booster and influeanza vaccine.  OK to try synthroid replacement at low dose.      F/u with MENA in 3 months and with me in 6 months  CBC and LDH each visit.     Carri Gilbert MD           Again, thank you for allowing me to participate in the care of your patient.        Sincerely,        Carri Gilbert MD

## 2021-10-06 NOTE — NURSING NOTE
Oncology Rooming Note    October 6, 2021 12:57 PM   Marnie Pittman is a 89 year old female who presents for:    Chief Complaint   Patient presents with     Oncology Clinic Visit     B-cell lymphoma of intrathoracic lymph nodes     Blood Draw     Labs drawn via  by RN. VS taken.     Initial Vitals: /68   Pulse 67   Temp 97.1  F (36.2  C) (Oral)   Resp 16   Wt 66.2 kg (146 lb)   SpO2 97%  There is no height or weight on file to calculate BMI. There is no height or weight on file to calculate BSA.  No Pain (0) Comment: Data Unavailable   No LMP recorded. Patient is postmenopausal.  Allergies reviewed: Yes  Medications reviewed: Yes    Medications: Medication refills not needed today.  Pharmacy name entered into [employer]:    [id number] - [place], [place] - [address] [facility] [address] [facility] - [place], [place] - [facility] [address]    Clinical concerns: Concerns about Increase in tiredness, Got prescribed levothyroxine but wants to verify if it is advised to take or not.       Glenna Rodgers LPN

## 2021-11-15 NOTE — PROGRESS NOTES
RNCC received message from triage regarding patient(see encounter).  Request for RNCC to call son Shailesh. RNCC called Shailesh- introduced self and reason for call.   RNCC inquired about symptoms. Shailesh states that pt is not SOB when at rest. Has developed SOB on exertion. Believes that she is stable when she is at rest, though they do not have a pulse ox. RNCC asked them to get a pulse ox today at local drug store which he is agreeable to do. RNCC educated to call triage if O2 is 90% or less, as this can indicate hypoxia.   Reviewed recent MD note with them. Per Dr. Gilbert, if she develops cough or increased SOB, we could try Rituximab+steroids therapy.  RNCC will discuss with Dr. Gilbert and get back to patient's son about next steps. Shailesh is appreciative of care and has no other questions or concerns at this time.     *1445: RNCC called Shailesh. RNCC spoke with Dr. Gilbert. Agreed that CT CAP was needed with a visit with her. Dr. Gilbert requested visit with pt on Thursday at 0930. Will also start pred 40mg daily x7 days to help with SOB. Shailesh is appreciative of call. Transferred to  at the end of call.    Ioana Cevallos, RN, MSN, OCN   RN Care Coordinator   [facility] [address] [address] [phone]

## 2021-11-15 NOTE — TELEPHONE ENCOUNTER
"[facility] Telephone Triage Note    Assessment:   Shailesh (son) reporting the following symptoms:  - shortness of breath, barely fine at rest  -increase SOB with any exertion, has to take frequency stops/rests, at most can walk about 50ft before having to take a rest.   -Started increase SOB for past 3 weeks.     Pt continues to lose weight, and has minimal appetite. Estimates lost 5lbs in past month.     In last 24hrs ate small bowl of oatmeal, small glass or orange juice, does not eat lunch/food during day, for dinner maybe a small piece of chicken. Drinking not nearly as much as needs to, recently went to PCP r/t constipation.     This writer educated on ways to encourage hydration through popsicles, juicy fruits, chicken broth, clear liquids, applesauce etc.     Continue to have high level of frequency and urgency with urination which son reports is baseline.     Denies fever, dizziness, lightheadedness, blurred vision, headaches, chest, cough, sore throat, loss of taste/smell.  (Pt has hx of COVID in December 2020).     Shailesh is wondering if Lymphoma from previous CT's back in June and wondering if masses are effecting airway and concern for masses growing increase difficulty breathing and would like to discuss with care team a txt plan.     Recommendations:  This writer recommended to take pt to emergency room to assess SOB since \"barely fine at rest\". Shailesh declines this option against medical advice, feels pt overall fine at rest not struggling to breathe however concern is if masses are getting worse then pt will not be able to live independently and wants to know next steps about initiating treatment.    Call matias Cash at [phone]    Routed to Care Team.     Follow-Up:    Instructed Shailesh for pt to seek care immediately for worsening symptoms, including: fever, chest pain, shortness of breath, dizziness, chest pain, pink sputum or coughs up blood, facial swelling, changes in mental status, temperature " higher than 100.4F.     12:08pm RNCC Ioana aware, and spoke to Shailesh.

## 2021-11-17 NOTE — PROGRESS NOTES
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    INTERVAL HISTORY:  Marnie was seen about 6 weeks ago in clinic, and had been doing well, but has developed worsening pulmonary symptoms and low back pain in that interval. She also notes about 20 pound weight loss in the last 2 months, primarily due to poor apetite.  She is particularly bothered by the back pain, which is sharp and on the lower left side of her lumbar resion,  although her son notes that her dyspnea with exertion is quite significant.  She can hardly walk without getting winded.  He also is concerned that with her decreased appetite and weight loss she has become more more frail.    Her primary care provider gave a Solumedrol dose pack for her low back pain, which caused significant jittery symptoms, so was discontinued by the patient before finishing. She subsequently did not take the prednisone that was prescribed by our clinic upon the report of her new symptoms.  Today, her symptoms are relatively stable    ONCOLOGIC HISTORY  Patient presented in December 2020 with frequent night time urination. She also had a COVID infection in November 2020, no hospitalization required. CT chest incidentally detected supraclavicular and mediastina lymphadenopathy. She denied significant changes to her health in past few months, and had no cough, SOB or chest pain.     For work -up, she underwent supraclavicular biopsy on January 6, 2021 with findings of focally necrotizing granulomatous inflammation with a multicentric giant cells. AFB and GMC stains were negative. Subsequently she had another EBUS with a biopsy of mediastinal nodes. Findings suggested atypia in the B cells and suspicious from the B-cell lymphoma.  Her PET on 12/29/20 had following conclusion:  IMPRESSION:   1.  Mediastinal, right  hilar, and right cervical lymphadenopathy with increased metabolic activity.  There is also increased metabolic activity in the area of the proximal right upper lobe consolidation/atelectasis.  Differential considerations would include lymphoma or potentially a right hilar mass with metastatic lymphadenopathy.  Recommend bronchoscopy for core biopsy of the right supraclavicular lymph node.  2.  Probable infectious/inflammatory process in the right lower lobe.  3.  Large hiatal hernia, unchanged.  4.  Diverticulosis.      Patient felt well and without symptoms initially. Given her age, we decided to observe her every 3 months. She has had surveillance CT scans that have been stable subsequently.      Labs:   Lab Results   Component Value Date    WBC 6.6 11/18/2021    HGB 11.6 (L) 11/18/2021    HCT 35.5 11/18/2021     11/18/2021     11/18/2021    POTASSIUM 3.2 (L) 11/18/2021    CHLORIDE 107 11/18/2021    CO2 30 10/06/2021    GLC 96 10/06/2021    BUN 23 10/06/2021    CR 1.1 11/16/2021    AST 22 10/06/2021    ALT 17 10/06/2021       Physical exam  BP (!) 146/67   Pulse 69   Temp 97.9  F (36.6  C) (Oral)   Wt 63.7 kg (140 lb 8 oz)   SpO2 98%   Gen: Well appearing, in NAD  HEENT: EOMI, PERRL, mmm, oropharynx clear. No adenopathy.   CV: Normal rate, regular rhythm. No m/r/g  Pulm: CTAB, no wheezing, normal work of breathing  Abd: Soft, nt/nd, no rebound/guarding  Ext: Warm and well perfused. No lower extremity edema.   Skin: No rash, cyanosis or petechial lesion  MSK: tenderness to palpation over the left lumbar region, just lateral to the spine.   Neuro: Alert and answering questions appropriately. CNII-XII grossly intact. Moving all extremities without issue or focal neurologic deficits.     IMAGING  CT CAP 11/16/21  1.  Masslike consolidation in the left upper lobe compatible with  worsening of disease.  2.  Small bilateral pleural effusions are new.  3.  Persistent mediastinal lymphadenopathy  compatible with metastasis.  4.  Increased hepatic and splenic lesions compatible with worsening  disease.  5.  New/progressed compression deformity at L1.  6.  Additional chronic findings as above.    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    #Lymphoproliferative disease, likely lymphoma of unclear histology  The patient was incidently found to have adenopathy 12/2020, with subsequent EBUS biopsy and flow showing possible atypical  B-cells in the mediastinal LN, but the exact histology and diagnosis is uncertain.This likely represents a B-cell NHL, although we do not have further biopsy confirmation. She initially had a follow-up CT that showed stable/minimally increased lung conglomerate with post-obstruction atelectasis but no evidence of aggressive growth.     However, she has now developed new dyspnea, weight loss, poor appetite and low back pain, with her CT CAP from 11/16/21 shoing rather significant progressive adenopathy and liver/spleen involvement, along with an L1 lesion. Given the initially stable CT scans, with now fairly rapid progression of symptoms and adenopathy, suspect that she initially had an indolent lymphoma which is now transformed to a more aggressive lymphoma (DLBCL most likely).    We had a leona discussion with the patient and her son about her age, frailty and potential therapies given her seemingly aggressive disease.  Ideally, we would obtain a tissue diagnosis, likely via EBUS, and then consider options such as mini-R-CHOP, but the patient and son would prefer not to undergo an invasive procedure at this time.  They are also firmly in agreement that the patient would not want chemotherapy.  Taken together, we discussed that rituximab alone would not be adequate in this  situation, but Loncastuximab was recently approved for relapsed/refractory DLBCL and it is a single agent with potential benefit.  The patient and son are amenable to this option, if it can be obtained through insurance; since we do not have a tissue diagnosis as she has not had any therapy prior to this, it may not be approved.  Nevertheless, it does seem like the best non-chemotherapy option to have a potential benefit.  Given her overall functional status and age, it is not clear how well she would even tolerate mini-R-CHOP.     We also discussed that she would not be able to give Loncastuximab, that a comfort based approach with hospice would be appropriate.  The patient and son were in agreement with this plan, as they would like to ensure quality of life for as long as she has left.    For today, we will proceed to schedule Loncastuximab infusion for next week, and pursue radiation oncology referral for radiation to the L1 lesion.     Plan:  - Refer to Rad Onc for L1 lesion radiation  - Submit PA for Loncastuximab today   - If approved, plan for infusion next week with Dex on D-1, D0 and D3    Patient seen and staffed with Dr. Gilbert.    Bam Almeida MD PhD  Heme/Onc/Transplant Fellow  Pgr #[id number]    Attestation:  Today, I  saw and examined the patient independently in clinic and discussed the recommendations. I reviewed the case with the fellow and agree with the note as above.     Marnie has now symptomatic and worsening disease, her CT suggest stage IV and she now developed reduced appetite and fatigue.  She is not a candidate for chemotherapy, but biological therapy can be considered for her B-cell lymphoma most likely aggressive type now.    We recommended to start loncastuximab for her. This [id number]-[id number] antibody-toxin conjugate is well tolerated and potentially offers solid disease control even in advanced stage. She certainly is not a candidate for R_CHOP or other multi agent  chemotherapy. She still enjoyes independence and social interactions and wishes to try some therapy for treat her cancer. Given her KPS is no still 70-80%, she is a reasonable candidate to attempt to treat her.     Carri Gilbert MD  Professor of Medicine

## 2021-11-18 PROBLEM — C85.12: Status: ACTIVE | Noted: 2021-01-01

## 2021-11-18 NOTE — LETTER
11/18/2021         RE: Marnie Pittman  [address]  [place] [facility] [address]        Dear Colleague,    Thank you for referring your patient, Marnie Pittman, to the [facility] [place]. Please see a copy of my visit note below.    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    INTERVAL HISTORY:  Marnie was seen about 6 weeks ago in clinic, and had been doing well, but has developed worsening pulmonary symptoms and low back pain in that interval. She also notes about 20 pound weight loss in the last 2 months, primarily due to poor apetite.  She is particularly bothered by the back pain, which is sharp and on the lower left side of her lumbar resion,  although her son notes that her dyspnea with exertion is quite significant.  She can hardly walk without getting winded.  He also is concerned that with her decreased appetite and weight loss she has become more more frail.    Her primary care provider gave a Solumedrol dose pack for her low back pain, which caused significant jittery symptoms, so was discontinued by the patient before finishing. She subsequently did not take the prednisone that was prescribed by our clinic upon the report of her new symptoms.  Today, her symptoms are relatively stable    ONCOLOGIC HISTORY  Patient presented in December 2020 with frequent night time urination. She also had a COVID infection in November 2020, no hospitalization required. CT chest incidentally detected supraclavicular and mediastina lymphadenopathy. She denied significant changes to her health in past few months, and had no cough, SOB or chest pain.     For work -up, she underwent supraclavicular biopsy on January 6, 2021 with findings of focally necrotizing granulomatous inflammation with a  multicentric giant cells. AFB and GMC stains were negative. Subsequently she had another EBUS with a biopsy of mediastinal nodes. Findings suggested atypia in the B cells and suspicious from the B-cell lymphoma.  Her PET on 12/29/20 had following conclusion:  IMPRESSION:   1.  Mediastinal, right hilar, and right cervical lymphadenopathy with increased metabolic activity.  There is also increased metabolic activity in the area of the proximal right upper lobe consolidation/atelectasis.  Differential considerations would include lymphoma or potentially a right hilar mass with metastatic lymphadenopathy.  Recommend bronchoscopy for core biopsy of the right supraclavicular lymph node.  2.  Probable infectious/inflammatory process in the right lower lobe.  3.  Large hiatal hernia, unchanged.  4.  Diverticulosis.      Patient felt well and without symptoms initially. Given her age, we decided to observe her every 3 months. She has had surveillance CT scans that have been stable subsequently.      Labs:   Lab Results   Component Value Date    WBC 6.6 11/18/2021    HGB 11.6 (L) 11/18/2021    HCT 35.5 11/18/2021     11/18/2021     11/18/2021    POTASSIUM 3.2 (L) 11/18/2021    CHLORIDE 107 11/18/2021    CO2 30 10/06/2021    GLC 96 10/06/2021    BUN 23 10/06/2021    CR 1.1 11/16/2021    AST 22 10/06/2021    ALT 17 10/06/2021       Physical exam  BP (!) 146/67   Pulse 69   Temp 97.9  F (36.6  C) (Oral)   Wt 63.7 kg (140 lb 8 oz)   SpO2 98%   Gen: Well appearing, in NAD  HEENT: EOMI, PERRL, mmm, oropharynx clear. No adenopathy.   CV: Normal rate, regular rhythm. No m/r/g  Pulm: CTAB, no wheezing, normal work of breathing  Abd: Soft, nt/nd, no rebound/guarding  Ext: Warm and well perfused. No lower extremity edema.   Skin: No rash, cyanosis or petechial lesion  MSK: tenderness to palpation over the left lumbar region, just lateral to the spine.   Neuro: Alert and answering questions appropriately. CNII-XII  grossly intact. Moving all extremities without issue or focal neurologic deficits.     IMAGING  CT CAP 11/16/21  1.  Masslike consolidation in the left upper lobe compatible with  worsening of disease.  2.  Small bilateral pleural effusions are new.  3.  Persistent mediastinal lymphadenopathy compatible with metastasis.  4.  Increased hepatic and splenic lesions compatible with worsening  disease.  5.  New/progressed compression deformity at L1.  6.  Additional chronic findings as above.    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    #Lymphoproliferative disease, likely lymphoma of unclear histology  The patient was incidently found to have adenopathy 12/2020, with subsequent EBUS biopsy and flow showing possible atypical  B-cells in the mediastinal LN, but the exact histology and diagnosis is uncertain.This likely represents a B-cell NHL, although we do not have further biopsy confirmation. She initially had a follow-up CT that showed stable/minimally increased lung conglomerate with post-obstruction atelectasis but no evidence of aggressive growth.     However, she has now developed new dyspnea, weight loss, poor appetite and low back pain, with her CT CAP from 11/16/21 shoing rather significant progressive adenopathy and liver/spleen involvement, along with an L1 lesion. Given the initially stable CT scans, with now fairly rapid progression of symptoms and adenopathy, suspect that she initially had an indolent lymphoma which is now transformed to a more aggressive lymphoma (DLBCL most likely).    We had a leona discussion with the patient and her son about her age, frailty and potential therapies given her seemingly aggressive disease.  Ideally, we would obtain a tissue diagnosis, likely via EBUS, and  then consider options such as mini-R-CHOP, but the patient and son would prefer not to undergo an invasive procedure at this time.  They are also firmly in agreement that the patient would not want chemotherapy.  Taken together, we discussed that rituximab alone would not be adequate in this situation, but Loncastuximab was recently approved for relapsed/refractory DLBCL and it is a single agent with potential benefit.  The patient and son are amenable to this option, if it can be obtained through insurance; since we do not have a tissue diagnosis as she has not had any therapy prior to this, it may not be approved.  Nevertheless, it does seem like the best non-chemotherapy option to have a potential benefit.  Given her overall functional status and age, it is not clear how well she would even tolerate mini-R-CHOP.     We also discussed that she would not be able to give Loncastuximab, that a comfort based approach with hospice would be appropriate.  The patient and son were in agreement with this plan, as they would like to ensure quality of life for as long as she has left.    For today, we will proceed to schedule Loncastuximab infusion for next week, and pursue radiation oncology referral for radiation to the L1 lesion.     Plan:  - Refer to Rad Onc for L1 lesion radiation  - Submit PA for Loncastuximab today   - If approved, plan for infusion next week with Dex on D-1, D0 and D3    Patient seen and staffed with Dr. Gilbert.    Bam Almeida MD PhD  Heme/Onc/Transplant Fellow  Pgr #[id number]    Attestation:  Today, I  saw and examined the patient independently in clinic and discussed the recommendations. I reviewed the case with the fellow and agree with the note as above.     Marnie has now symptomatic and worsening disease, her CT suggest stage IV and she now developed reduced appetite and fatigue.  She is not a candidate for chemotherapy, but biological therapy can be considered for her B-cell  lymphoma most likely aggressive type now.    We recommended to start loncastuximab for her. This II30-uklidylr antibody-toxin conjugate is well tolerated and potentially offers solid disease control even in advanced stage. She certainly is not a candidate for R_CHOP or other multi agent chemotherapy. She still enjoyes independence and social interactions and wishes to try some therapy for treat her cancer. Given her KPS is no still 70-80%, she is a reasonable candidate to attempt to treat her.     Carri Gilbert MD  Professor of Medicine

## 2021-11-18 NOTE — NURSING NOTE
I drew labs via venipuncture on this pt while in clinic. They tolerated this well.   Labs sent down to the first floor labs.    Desi Chapa MA

## 2021-11-18 NOTE — NURSING NOTE
Oncology Rooming Note    November 18, 2021 9:53 AM   Marnie Pittman is a 89 year old female who presents for:    Chief Complaint   Patient presents with     Oncology Clinic Visit     B-cell lymphoma of intrathoracic lymph nodes     Initial Vitals: BP (!) 146/67   Pulse 69   Temp 97.9  F (36.6  C) (Oral)   Wt 63.7 kg (140 lb 8 oz)   SpO2 98%  There is no height or weight on file to calculate BMI. There is no height or weight on file to calculate BSA.  Severe Pain (7) Comment: Data Unavailable   No LMP recorded. Patient is postmenopausal.  Allergies reviewed: Yes  Medications reviewed: Yes    Medications: Medication refills not needed today.  Pharmacy name entered into Rundown:    [id number] - [place], [place] - [address] [address] [facility] - [place], [place] - [address]    Clinical concerns: none       Eleni Boss CMA

## 2021-11-19 NOTE — PROGRESS NOTES
OUTBOUND CALL: RNCC called patient's son Shailesh.   Informed Shailesh of hyperuricemia- Dr. Gilbert advises to start allopurinol 300mg daily. Also educated him to have mother increase PO fluid intake to help flush out kidneys.  RNCC informed Shailesh that writer reached out to infusion finance and will let him know when lonca has been secured financially. Will get patient set up for infusion afterwards.     Chemo Teach  re: loncastuximab treatment plan   -Reviewed treatment schedule including cycle length, lab monitoring and prn medications.  -Verbal and written instruction provided using:   [facility] loncastuximab  Drug Information: reviewed Possible Side Effects, When to Contact Your Doctor of Health Care Provider and Self Care Tips sections.   We discussed what to expect on day of infusion/visitor policy    Discussed dosage and frequency of Dex.     Patient's son voiced understanding and appreciation of above information and denies any further questions, and he/she understands that I will follow and provide coordination as needed.    Ioana Cevallos, RN, MSN, OCN   RN Care Coordinator   [facility] [address] [address] [phone]

## 2021-11-23 NOTE — PROGRESS NOTES
OUTBOUND CALL: RNCC called patient's son Shailesh to give him an update on Lonca. Callback number below left for patient's son.     INBOUND CALL: RNCC called and discussed recent MyChart message to patient. RNCC verbalized understanding of son's frustration. Apologized for inconvenience and delay in care. RNCC has been in contact with [facility] scheduling supervisor who has expedited issue to rad/onc department. Son is appreciative of care and urgency. RNCC will alert him when we hear back form rad/onc  RNCC gave Shailesh update on Lonca. Pending insurance approval given diagnosis. Review Dr. Gilbert's note. Infusion finance will let RNCC and MD know if this med is approved without a current biopsy. Son states that they are open to getting another biopsy if need be for diagnostic purposes and insurance approval. RNCC will discuss further with son if/when we get to that point. For now, infusion will no be able to happen this week while insurance approval is pending and given the holiday. Son verbalizes understanding. Will touch base with RNCC next week. More eager to start XRT in hopes to help with patient's pain.      OUTBOUND CALL: RNCC was informed that pt has rad/onc appt scheduled for below. Shailesh is appreciative of help and is agreeable to appts this week. RNCC will touch base with Shailesh next week.      Future Appointments   Date Time Provider Department Center   11/24/2021  2:30 PM Lachelle Wagner MD [facility] CLIN   11/26/2021  8:00 AM Miguel Leahy MD [facility] CLIN   1/7/2022 12:15 PM  [facility]   1/7/2022 12:45 PM Marilyn Longoria PA-C [facility]   4/6/2022  2:30 PM  [facility]   4/6/2022  3:00 PM Carri Gilbert MD [facility]       Ioana Cevallos, RN, MSN, OCN   RN Care Coordinator   [facility] [address] [address]   [phone]

## 2021-11-24 NOTE — PROGRESS NOTES
INITIAL PATIENT ASSESSMENT    Diagnosis: l1 lesion    Prior radiation therapy: None    Prior chemotherapy: None    Prior hormonal therapy:No    Pain Eval:  Current history of pain associated with this visit:   Intensity: 9/10  Current: throbbing  Location: lower back  Treatment: rest    Psychosocial  Living arrangements: at nursing home  Fall Risk: wheelchair/stretcher bound   referral needs: Not needed    Advanced Directive: Yes - Location: nursing home  Implantable Cardiac Device? No      Nurse face-to-face time: Level 2:  5 min face to face time          Review of Systems   Respiratory: Positive for shortness of breath and wheezing.    Cardiovascular: Positive for leg swelling.   Musculoskeletal: Positive for back pain.   All other systems reviewed and are negative.

## 2021-11-24 NOTE — LETTER
2021     RE: Marnie Pittman  [address]  [facility] [address]    Dear Colleague,    Thank you for referring your patient, Marnie Pittman, to the [facility] RADIATION ONCOLOGY. Please see a copy of my visit note below.    Department of Radiation Oncology                   [address]  [address] [place]  [address]  Office:  [phone]  Fax:  [phone]   Radiation Oncology Clinic  [address] [place] [address]  Phone:  [phone]  Fax:  [phone]     RE: Marnie Pittman : 1929   MRN: [id number] JAKE: 2021     OUTPATIENT VISIT NOTE       PROBLEM: Indolent B Cell Lymphoma      was seen for initial consultation in the Dept of Therapeutic Radiology on 2021 at the request of Dr. Gilbert.    HISTORY OF PRESENT ILLNESS: Ms. Pittman is a 89 yo female with a   She initially developed increased urinary frequency, night sweat running nose, associated with 5 lb weight loss near end of . She was treated with for UTI. CXR revealed right perihilar infiltrate/opacity. She did test positive for COVID-19 at that time. Following recovery, she was further evaluated with CT of the chest/abdomen/pelvis on , which showed mediastinal and right hilar adenopathy, up to 2.9 cm, with compression of the right upper lobe.     She was referred to Dr. Macario in pulmonary medicine and subsequently underwent a PET/CT on20 which confirmed hypermetabolism in these lymph nodes, with max SUV approximately 16, and no other areas of abnormal uptake. She then had an US-guided biopsy of the right supraclavicular lymph nodes on 21. Pathology revealed granulomatous inflammation, likely due to infection though no mycobacterial or fungal organisms were identified. Repeat CT of the chest 3/15/21 showed progression of the lymphadenopathy and right upper lobe compression.     She ultimately under a bronchoscopy with EBUS  "on 3/24/21. FNA of 4R was suspicious for lymphoma (based on cytology and IHC features), though negative for monoclonal B cell population; 4L, 10R and 7 were compatible with franklyn sampling. Bronchoalveolar lavage and right upper lobe brushing and biopsy were both negative for malignancy.     Ms. Pittman then established care with Dr. Gilbert. To confirm diagnosis, she was referred to Dr. Copeland for consideration of additional tissue biopsy. A repeat CT of the chest/abdomen done on 6/11/21 showed stable R supraclav/mediastinal/right hilar adenopathy with narrowing of the right upper lobe bronchus. Ms. Pittman is not interested in invasive procedures. Given stable imaging findings and her lack of symptoms, observation was recommended.     More recently, Ms. Pittman developed dyspnea on exertion that was quite significant. She could hardly walk without getting winded. She additionally lost 20 lb in the past 2 months due to poor appetite. She also reported new low back pain. Restaging CT of the C/A/P on 11/16/21 unfortunately showed disease progression with worsening mass-like consolidation of the right upper lobe and occlusion of the right upper lobe bronchus, stable mediastinal adenopathy, new liver and spleen lesions, and marked compression deformity at L1. She is referred to us for consideration of palliative radiotherapy to the lumbar spine.     Ms. Pittman is accompanied by her son Shailesh. She first experienced the low back pain about 1.5 months ago. She does not recall hearing a \"pop\", and felt the pain was gradual. She has taken Tylenol and Alleve without much relieve and currently rates the pain at 6 out of 10. Her son shares that she is not keen on taking pain medication and has not been taking OTC medication consistently. Her pain is exacerbated by movement. She also has worsening dyspnea, especially on exertion. She thinks her aeppietite has improved recently. She is still able to maintain independence " and cooks for herself. Her son Shailesh is very involved in her care.     PAST MEDICAL HISTORY:   Basal cell carcinoma  Bilateral cataracts  Essential hypertension  S/p  x 7    CHEMOTHERAPY HISTORY: None     PAST RADIATION THERAPY HISTORY: None    MEDICATIONS:   Current Outpatient Medications   Medication Sig Dispense Refill     acetaminophen (TYLENOL) 500 MG tablet Take 500 mg by mouth       allopurinol (ZYLOPRIM) 300 MG tablet Take 1 tablet (300 mg) by mouth daily 30 tablet 1     COMPOUNDED NON-CONTROLLED SUBSTANCE (CMPD RX) - PHARMACY TO MIX COMPOUNDED MEDICATION Estriol 0.1% cream (1mg/gram) in HRT base. Place 1 gram vaginally daily for 2 weeks, then vaginally twice weekly. (Patient not taking: Reported on 2021) 30 g 6     hydrochlorothiazide (HYDRODIURIL) 25 MG tablet Take 25 mg by mouth daily       ALLERGIES: is allergic to seasonal allergies.    SOCIAL HISTORY:   Never smoker    Very occasional alcohol  Lives in 1 bedroom apartment, fairly independently, still drives and does shopping    FAMILY HISTORY:   family history is not on file.    REVIEW OF SYMPTOMS:  A full 14-point review of systems was performed. See HPI for details     PHYSICAL EXAMINATION:    /62 (BP Location: Right arm, Patient Position: Sitting, Cuff Size: Adult Regular)   Pulse 80   Resp 18   Wt 63.6 kg (140 lb 4.8 oz)   SpO2 96%    Gen: NAD, sitting in wheelchair, hard of hearing  HEENT: unremarkable  CV: well perfused  Resp: no acute distress, but does seem to gasp for air at the end of sentences  Neuro: face symmetric, speech intact, muscle strength not examined.       IMAGES:      ASSESSMENT AND PLAN: In summary, Ms. Pittman is a timothy 89 yo female with a B cell lymphoma initially involving the right supraclav/mediastinal/right hilar nodes, thought to be indolent. After a period of observation, she now has disease progression in the nodes, as well as new lesions in the liver and spleen. There is a questionable  lesion in L1, which is causing significant pain.     I reviewed her workup thus far. I reviewed the imaging studies in detail. There is significant compression fracture at L1, however, it appears to be osteoporotic in nature, rather than lymphoma related. I did discuss her CT with radiology, who concurs that L1 fracture is not pathologic.     As such, there is no role for radiation therapy for L1. She is symptomatic with pain, but is hesitant to use pain medication according to her son. Family is agreeable to work with primary care physician to optimize pain control. I did mention that vertebroplasty sometimes can be used to restore anatomy and reduce pain; however, she is not interested in any invasive procedure, which is very understandable given her age.     I will reach out to Dr. Gilbert. She is ready to proceed with systemic therapy, likely Loncastuximab, pending insurance approval.     Thank you for allowing us to participate in this patient's care.  Please feel free to call with any questions or concerns.       Lachelle Wagner M.D./Ph.D.  Radiation Oncologist   Department of Radiation Oncology  [facility]  Phone: [phone]     I reviewed patient's chart, internal/external medical records, imaging studies (including actual images), labs and pathology reports.  I interviewed and counseled the patient face to face.  I additionally discussed the case with patient's referring physicians and care team (Dr. Gilbert).      80 minutes were spent on the date of the encounter doing chart review, history and exam, documentation and further activities as noted above.     Lachelle Wagner MD    CC  Patient Care Team:  No Ref-Primary, Physician as PCP - General  Carri Gilbert MD as MD (Hematology)  Carri Gilbert MD as Assigned Cancer Care Provider  Freda Copeland MD as Assigned Surgical Provider  Ioana Cevallos RN as Specialty Care Coordinator (Hematology &  Oncology)  ABIGAIL EDWARDS      INITIAL PATIENT ASSESSMENT    Diagnosis: l1 lesion    Prior radiation therapy: None    Prior chemotherapy: None    Prior hormonal therapy:No    Pain Eval:  Current history of pain associated with this visit:   Intensity: 9/10  Current: throbbing  Location: lower back  Treatment: rest    Psychosocial  Living arrangements: at nursing home  Fall Risk: wheelchair/stretcher bound   referral needs: Not needed    Advanced Directive: Yes - Location: nursing home  Implantable Cardiac Device? No    Nurse face-to-face time: Level 2:  5 min face to face time    Review of Systems   Respiratory: Positive for shortness of breath and wheezing.    Cardiovascular: Positive for leg swelling.   Musculoskeletal: Positive for back pain.   All other systems reviewed and are negative.

## 2021-11-24 NOTE — PROGRESS NOTES
Department of Radiation Oncology                   [address]  [address] [place]  [address]  Office:  [phone]  Fax:  [phone]   Radiation Oncology Clinic  [address] [place] [address]  Phone:  [phone]  Fax:  [phone]     RE: Marnie Pittman : 1929   MRN: [id number] JAKE: 2021     OUTPATIENT VISIT NOTE       PROBLEM: Indolent B Cell Lymphoma      was seen for initial consultation in the Dept of Therapeutic Radiology on 2021 at the request of Dr. Gilbert.    HISTORY OF PRESENT ILLNESS: Ms. Pittman is a 89 yo female with a   She initially developed increased urinary frequency, night sweat running nose, associated with 5 lb weight loss near end of . She was treated with for UTI. CXR revealed right perihilar infiltrate/opacity. She did test positive for COVID-19 at that time. Following recovery, she was further evaluated with CT of the chest/abdomen/pelvis on , which showed mediastinal and right hilar adenopathy, up to 2.9 cm, with compression of the right upper lobe.     She was referred to Dr. Macario in pulmonary medicine and subsequently underwent a PET/CT on20 which confirmed hypermetabolism in these lymph nodes, with max SUV approximately 16, and no other areas of abnormal uptake. She then had an US-guided biopsy of the right supraclavicular lymph nodes on 21. Pathology revealed granulomatous inflammation, likely due to infection though no mycobacterial or fungal organisms were identified. Repeat CT of the chest 3/15/21 showed progression of the lymphadenopathy and right upper lobe compression.     She ultimately under a bronchoscopy with EBUS on 3/24/21. FNA of 4R was suspicious for lymphoma (based on cytology and IHC features), though negative for monoclonal B cell population; 4L, 10R and 7 were compatible with franklyn sampling. Bronchoalveolar lavage and right upper lobe brushing and biopsy were  "both negative for malignancy.     Ms. Pittman then established care with Dr. Gilbert. To confirm diagnosis, she was referred to Dr. Copeland for consideration of additional tissue biopsy. A repeat CT of the chest/abdomen done on 21 showed stable R supraclav/mediastinal/right hilar adenopathy with narrowing of the right upper lobe bronchus. Ms. Pittman is not interested in invasive procedures. Given stable imaging findings and her lack of symptoms, observation was recommended.     More recently, Ms. Pittman developed dyspnea on exertion that was quite significant. She could hardly walk without getting winded. She additionally lost 20 lb in the past 2 months due to poor appetite. She also reported new low back pain. Restaging CT of the C/A/P on 21 unfortunately showed disease progression with worsening mass-like consolidation of the right upper lobe and occlusion of the right upper lobe bronchus, stable mediastinal adenopathy, new liver and spleen lesions, and marked compression deformity at L1. She is referred to us for consideration of palliative radiotherapy to the lumbar spine.     Ms. Pittman is accompanied by her son Shailesh. She first experienced the low back pain about 1.5 months ago. She does not recall hearing a \"pop\", and felt the pain was gradual. She has taken Tylenol and Alleve without much relieve and currently rates the pain at 6 out of 10. Her son shares that she is not keen on taking pain medication and has not been taking OTC medication consistently. Her pain is exacerbated by movement. She also has worsening dyspnea, especially on exertion. She thinks her aeppietite has improved recently. She is still able to maintain independence and cooks for herself. Her son Shailesh is very involved in her care.     PAST MEDICAL HISTORY:   Basal cell carcinoma  Bilateral cataracts  Essential hypertension  S/p  x 7    CHEMOTHERAPY HISTORY: None     PAST RADIATION THERAPY HISTORY: " None    MEDICATIONS:   Current Outpatient Medications   Medication Sig Dispense Refill     acetaminophen (TYLENOL) 500 MG tablet Take 500 mg by mouth       allopurinol (ZYLOPRIM) 300 MG tablet Take 1 tablet (300 mg) by mouth daily 30 tablet 1     COMPOUNDED NON-CONTROLLED SUBSTANCE (CMPD RX) - PHARMACY TO MIX COMPOUNDED MEDICATION Estriol 0.1% cream (1mg/gram) in HRT base. Place 1 gram vaginally daily for 2 weeks, then vaginally twice weekly. (Patient not taking: Reported on 4/28/2021) 30 g 6     hydrochlorothiazide (HYDRODIURIL) 25 MG tablet Take 25 mg by mouth daily         ALLERGIES: is allergic to seasonal allergies.    SOCIAL HISTORY:   Never smoker    Very occasional alcohol  Lives in 1 bedroom apartment, fairly independently, still drives and does shopping      FAMILY HISTORY:   family history is not on file.    REVIEW OF SYMPTOMS:  A full 14-point review of systems was performed. See HPI for details     PHYSICAL EXAMINATION:    /62 (BP Location: Right arm, Patient Position: Sitting, Cuff Size: Adult Regular)   Pulse 80   Resp 18   Wt 63.6 kg (140 lb 4.8 oz)   SpO2 96%    Gen: NAD, sitting in wheelchair, hard of hearing  HEENT: unremarkable  CV: well perfused  Resp: no acute distress, but does seem to gasp for air at the end of sentences  Neuro: face symmetric, speech intact, muscle strength not examined.     IMAGES:      ASSESSMENT AND PLAN: In summary, Ms. Pittman is a timothy 89 yo female with a B cell lymphoma initially involving the right supraclav/mediastinal/right hilar nodes, thought to be indolent. After a period of observation, she now has disease progression in the nodes, as well as new lesions in the liver and spleen. There is a questionable lesion in L1, which is causing significant pain.     I reviewed her workup thus far. I reviewed the imaging studies in detail. There is significant compression fracture at L1, however, it appears to be osteoporotic in nature, rather than  lymphoma related. I did discuss her CT with radiology, who concurs that L1 fracture is not pathologic.     As such, there is no role for radiation therapy for L1. She is symptomatic with pain, but is hesitant to use pain medication according to her son. Family is agreeable to work with primary care physician to optimize pain control. I did mention that vertebroplasty sometimes can be used to restore anatomy and reduce pain; however, she is not interested in any invasive procedure, which is very understandable given her age.     I will reach out to Dr. Gilbert. She is ready to proceed with systemic therapy, likely Loncastuximab, pending insurance approval.         Thank you for allowing us to participate in this patient's care.  Please feel free to call with any questions or concerns.       Lachelle Wagner M.D./Ph.D.  Radiation Oncologist   Department of Radiation Oncology  [facility]  Phone: [phone]       I reviewed patient's chart, internal/external medical records, imaging studies (including actual images), labs and pathology reports.  I interviewed and counseled the patient face to face.  I additionally discussed the case with patient's referring physicians and care team (Dr. Gilbert).      80 minutes were spent on the date of the encounter doing chart review, history and exam, documentation and further activities as noted above.       Lachelle Wagner MD    CC  Patient Care Team:  No Ref-Primary, Physician as PCP - General  Carri Gilbert MD as MD (Hematology)  Carri Gilbert MD as Assigned Cancer Care Provider  Freda Copeland MD as Assigned Surgical Provider  Ioana Cevallos RN as Specialty Care Coordinator (Hematology & Oncology)  CARRI GILBERT

## 2021-11-30 NOTE — PROGRESS NOTES
RNCC called and spoke with son Shailesh. Reviewed notes from rad/onc visit. At this time, LMN has also been written and sent to insurance for potential approval of Lonca. Will let Shailesh know the result of this.   Goal for now is pain management. RNCc educated on palliative care. Dr. Gilbert also ordered tramadol for pain. Discussed dose and frequency. Son states she has positional pain; agreeable to trying tramadol.   Has an appt on Thrusday with PCP to also discus pain management. Agrees to keep appt for now with PCP and will look for palliative care appt. RNCC suggested cancer rehab for potential PT and OT. Son states that pt is currently seeing PT and OT at nursing home and is very happy with care they have provided. Declines cancer rehab at this time.     POC:  -Await Lonca approval from insurance  -Schedule palliative care  -Continue with PT and OT  -Start tramadol q6 prn    Patient's son verbalizes understanding of POC and has no other questions or concerns at this time.     Ioana Cevallos, RN, MSN, OCN   RN Care Coordinator   [facility] Cancer [address] [address] [phone]

## 2021-12-02 NOTE — PROGRESS NOTES
OUTBOUND CALL: RNCC called and spoke with son.   RNCC spoke with Dr. Gilbert about patient's respiratory concerns. Start pred 60mg daily x5 days. Discussed s/e of steroids, as well to take in am with food to avoid GI upset.   Mata to see [facility] next week to eval and potential walking test  Dr. Gilbert will call Shailesh today or tomorrow to discuss POC and further recommendations.   Shailesh verbalizes understanding of POC and has no other questions or concerns at this time.     Ioana Cevallos, RN, MSN, OCN   RN Care Coordinator   [facility] [address] [address] [phone]

## 2021-12-02 NOTE — PROGRESS NOTES
I contacted Shailesh Dye son today and reviewed her worsening symptoms. Given her age and lung localization, we discussed the goals of care and agreed she wants to focus on comfort and avoiding hospitals.  We reached consensus to reach out to Hospice.  For air hunger, I recommended to try prednisone 50mg for few days to ease up SOB and airway tightness.  Carri Gilbert MD

## 2021-12-02 NOTE — TELEPHONE ENCOUNTER
[facility] Triage    Outgoing call: Ioana Cevallos forward message to triage asking them to call Marnie son Shailesh in regards to breathing.    1215 pm calling Shailesh    Breathing is getting progressively worse (frightened last night and woke up and hard time breathing).  Last night she just could not catch her breath.    When she is sitting up she can eat and drink (presently doing)and she has an audible wheeze, and she has SOB when walking no more than 10 feet.  This is more chronic than acute except for last night.    No dizzy  No light headedness  No fever  Eating and drinking normal  Voiding normally  No stool will start on miralax  A few days ago had a feeling of constriction around her chest but none now  Normal color around lips, fingers  Talking  No confusion    Shailesh would like a plan of care for his mom's breathing.  He does not want it to   Shailesh would like to speak to Ofelia and or Ioana about a plan of care for now and in the future on Lonca, breathing care, hospice, palliative and he does not want it to turn into an emergency situation.  Mainly how can she become more comfortable with her breathing.    Routing to Ioana Cevallos and Ofelia

## 2021-12-06 NOTE — PROGRESS NOTES
OUTBOUND CALL: RNCC received IB from son about upcoming appts.   RNCC called son. Confirmed that patient is pursuing hospice. She is using [facility]. RNCC inquired about air hunger with prednisone. Son states he does not know, as he did not get a chance to see mom this weekend. He states he can discuss with hospice but will let RNCC know if anything else is needed. Appreciative of care. Would like future appts cancelled.     Ioana Cevallos, RN, MSN, OCN   RN Care Coordinator   [facility] [address] [address]   [phone]

## 2022-03-16 ENCOUNTER — PATIENT OUTREACH (OUTPATIENT)
Dept: ONCOLOGY | Facility: CLINIC | Age: 87
End: 2022-03-16
Payer: COMMERCIAL

## 2023-12-11 NOTE — ADDENDUM NOTE
Addended by: FLORESITA LLAMAS on: 8/27/2021 10:36 AM     Modules accepted: Orders    
Addended by: FLORESITA LLAMAS on: 9/8/2021 04:47 PM     Modules accepted: Orders    
Lico SAUCEDO, Med Student